# Patient Record
Sex: FEMALE | Race: WHITE | Employment: UNEMPLOYED
[De-identification: names, ages, dates, MRNs, and addresses within clinical notes are randomized per-mention and may not be internally consistent; named-entity substitution may affect disease eponyms.]

---
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
## 2020-02-10 ENCOUNTER — OFFICE VISIT (OUTPATIENT)
Dept: OBGYN CLINIC | Age: 20
End: 2020-02-10
Payer: COMMERCIAL

## 2020-02-10 VITALS
DIASTOLIC BLOOD PRESSURE: 72 MMHG | WEIGHT: 127.6 LBS | BODY MASS INDEX: 22.61 KG/M2 | SYSTOLIC BLOOD PRESSURE: 112 MMHG | HEIGHT: 63 IN

## 2020-02-10 PROCEDURE — 99213 OFFICE O/P EST LOW 20 MIN: CPT | Performed by: NURSE PRACTITIONER

## 2020-08-17 ENCOUNTER — OFFICE VISIT (OUTPATIENT)
Dept: OBGYN CLINIC | Age: 20
End: 2020-08-17
Payer: COMMERCIAL

## 2020-08-17 VITALS
BODY MASS INDEX: 21.62 KG/M2 | DIASTOLIC BLOOD PRESSURE: 56 MMHG | SYSTOLIC BLOOD PRESSURE: 122 MMHG | HEIGHT: 63 IN | WEIGHT: 122 LBS

## 2020-08-17 PROCEDURE — 99395 PREV VISIT EST AGE 18-39: CPT | Performed by: OBSTETRICS & GYNECOLOGY

## 2020-08-17 PROCEDURE — 90471 IMMUNIZATION ADMIN: CPT | Performed by: OBSTETRICS & GYNECOLOGY

## 2020-08-17 PROCEDURE — 90651 9VHPV VACCINE 2/3 DOSE IM: CPT | Performed by: OBSTETRICS & GYNECOLOGY

## 2020-08-17 ASSESSMENT — ENCOUNTER SYMPTOMS
WHEEZING: 0
ABDOMINAL PAIN: 0
VOMITING: 0
COUGH: 0
CONSTIPATION: 0
DIARRHEA: 0
NAUSEA: 0

## 2020-08-17 NOTE — PROGRESS NOTES
After obtaining consent, and per orders of Dr. Elif Azul, injection of Gardasil 0.5 ml given in Right deltoid by Miki Diego. Patient instructed to remain in clinic for 20 minutes afterwards, and to report any adverse reaction to me immediately.     LOT [id number]  EXP 4/3/22  [address] [id number]

## 2020-08-17 NOTE — PROGRESS NOTES
[facility] PHYSICIANS  [facility] [address] [address]  Dept: [phone]    DATE OF VISIT:  20      History and Physical    Zuhair Lockhart    :  2000  CHIEF COMPLAINT:    Chief Complaint   Patient presents with    Annual Exam    Injections     Interested in Gardasil                     HPI :   Zuhair Lockhart is a 21 y.o. female here for her annual exam. She is sexually active and uses OCPs for contraception. Periods are regular, occurring on the placebo week, not heavy or painful. She would like to initiate her Gardasil series. _____________________________________________________________________  Past Medical History:   Diagnosis Date    Asthma     activity induced    Stress     gets hives                                                                   Past Surgical History:   Procedure Laterality Date    ADENOIDECTOMY      TONSILLECTOMY      WISDOM TOOTH EXTRACTION  2018     Family History   Problem Relation Age of Onset    Migraines Mother     High Blood Pressure Father     Migraines Maternal Grandmother     Heart Attack Maternal Grandfather     Diabetes Paternal Grandmother         IDDM    Breast Cancer Paternal Grandmother         late 52's    Heart Attack Paternal Grandfather     Diabetes Paternal Grandfather         IDDM     Social History     Tobacco Use   Smoking Status Never Smoker   Smokeless Tobacco Never Used     Social History     Substance and Sexual Activity   Alcohol Use No    Alcohol/week: 0.0 standard drinks     Current Outpatient Medications   Medication Sig Dispense Refill    albuterol sulfate  (90 Base) MCG/ACT inhaler Inhale 2 puffs into the lungs every 6 hours as needed for Wheezing      Norethin Ace-Eth Estrad-FE (LOESTRIN 24 FE) 1-20 MG-MCG(24) TABS Take 28 tablets by mouth daily for 28 days Barrier contraception is recommended. 28 tablet 12     No current facility-administered medications for this visit. Orders   1. Need for HPV vaccine  OK INJECTION,THERAP/PROPH/DIAGNOST, IM OR SUBCUT    HPV vaccine 9-valent IM (GARDASIL 9)   2. Well woman exam without gynecological exam       Return in about 4 weeks (around 9/14/2020) for 2nd Gardasil. PLAN:  -Discussed need for Pap smears starting at age 24.  -Discussed STD's and condom use. Declines STD testing.   - Birth control Discussed. - Smoking risk factors Discussed. - Diet and exercise reviewed. - Routine health maintenance per patients PCP.      Electronically signed by Lady Sanchez MD on 8/17/20 at 10:54 AM EDT  [facility]

## 2020-10-19 ENCOUNTER — NURSE ONLY (OUTPATIENT)
Dept: OBGYN CLINIC | Age: 20
End: 2020-10-19
Payer: COMMERCIAL

## 2020-10-19 VITALS
WEIGHT: 126.8 LBS | BODY MASS INDEX: 22.47 KG/M2 | DIASTOLIC BLOOD PRESSURE: 70 MMHG | HEIGHT: 63 IN | SYSTOLIC BLOOD PRESSURE: 118 MMHG

## 2020-10-19 PROCEDURE — 90651 9VHPV VACCINE 2/3 DOSE IM: CPT | Performed by: NURSE PRACTITIONER

## 2020-10-19 PROCEDURE — 90471 IMMUNIZATION ADMIN: CPT | Performed by: NURSE PRACTITIONER

## 2021-01-19 ENCOUNTER — OFFICE VISIT (OUTPATIENT)
Dept: OBGYN CLINIC | Age: 21
End: 2021-01-19
Payer: COMMERCIAL

## 2021-01-19 VITALS
HEIGHT: 63 IN | DIASTOLIC BLOOD PRESSURE: 86 MMHG | SYSTOLIC BLOOD PRESSURE: 124 MMHG | WEIGHT: 128 LBS | BODY MASS INDEX: 22.68 KG/M2

## 2021-01-19 DIAGNOSIS — N94.10 DYSPAREUNIA IN FEMALE: ICD-10-CM

## 2021-01-19 DIAGNOSIS — N92.1 BREAKTHROUGH BLEEDING ON BIRTH CONTROL PILLS: Primary | ICD-10-CM

## 2021-01-19 LAB
CONTROL: PRESENT
PREGNANCY TEST URINE, POC: NEGATIVE

## 2021-01-19 PROCEDURE — 99213 OFFICE O/P EST LOW 20 MIN: CPT | Performed by: OBSTETRICS & GYNECOLOGY

## 2021-01-19 PROCEDURE — 81025 URINE PREGNANCY TEST: CPT | Performed by: OBSTETRICS & GYNECOLOGY

## 2021-01-19 RX ORDER — NORGESTIMATE AND ETHINYL ESTRADIOL 0.25-0.035
1 KIT ORAL DAILY
Qty: 1 PACKET | Refills: 12 | Status: SHIPPED | OUTPATIENT
Start: 2021-01-19 | End: 2022-02-01 | Stop reason: CLARIF

## 2021-01-19 ASSESSMENT — ENCOUNTER SYMPTOMS
CONSTIPATION: 0
WHEEZING: 0
NAUSEA: 0
DIARRHEA: 0
COUGH: 0
ABDOMINAL PAIN: 0
VOMITING: 0

## 2021-01-19 NOTE — PROGRESS NOTES
[facility], [address] [place] [address]  DATE OF VISIT:  21    Amado Brink    :  2000  CHIEF COMPLAINT:    Chief Complaint   Patient presents with    Discuss Medications     Currently on an OCP with BTB during the month    Dyspareunia     x1-2 months most of the time uncomfortable        HPI :   Amado Brink is a 21 y.o. female here for breakthrough bleeding on her OCP and dyspareunia. The bleeding has been happening over the past few months. Unpredictable. Sometimes just spotting, sometimes it's another full period. Pain with intercourse has been going on for a few months as well. Rates it as 7/10 and lasts several minutes. Past Medical History:   Diagnosis Date    Asthma     activity induced    Stress     gets hives      Past Surgical History:   Procedure Laterality Date    ADENOIDECTOMY      TONSILLECTOMY      WISDOM TOOTH EXTRACTION  2018     Family History   Problem Relation Age of Onset    Migraines Mother     High Blood Pressure Father     Migraines Maternal Grandmother     Heart Attack Maternal Grandfather     Diabetes Paternal Grandmother         IDDM    Breast Cancer Paternal Grandmother         late 52's    Heart Attack Paternal Grandfather     Diabetes Paternal Grandfather         IDDM     Social History     Tobacco Use   Smoking Status Never Smoker   Smokeless Tobacco Never Used     Social History     Substance and Sexual Activity   Alcohol Use No    Alcohol/week: 0.0 standard drinks     Current Outpatient Medications   Medication Sig Dispense Refill    norgestimate-ethinyl estradiol (ORTHO-CYCLEN, 28,) 0.25-35 MG-MCG per tablet Take 1 tablet by mouth daily 1 packet 12    Norethin Ace-Eth Estrad-FE (LOESTRIN 24 FE) 1-20 MG-MCG(24) TABS Take 28 tablets by mouth daily for 28 days Barrier contraception is recommended.  28 tablet 12    albuterol sulfate  (90 Base) MCG/ACT inhaler Inhale 2 puffs into the lungs every 6 hours as needed for Wheezing No current facility-administered medications for this visit. Allergies:  Patient has no known allergies. Gynecologic History:  Patient's last menstrual period was 2020. Sexually Active: Yes  STD History: No      OB History    Para Term  AB Living   0 0 0 0 0 0   SAB TAB Ectopic Molar Multiple Live Births   0 0 0 0 0 0       Review of Systems   Constitutional: Negative for chills and fever. HENT: Negative for hearing loss. Respiratory: Negative for cough and wheezing. Cardiovascular: Negative for chest pain and palpitations. Gastrointestinal: Negative for abdominal pain, constipation, diarrhea, nausea and vomiting. Genitourinary: Negative for dysuria, frequency and urgency. Musculoskeletal: Negative for myalgias. Skin: Negative for rash. Neurological: Negative for dizziness, weakness and headaches. Hematological: Does not bruise/bleed easily. Psychiatric/Behavioral: Negative for suicidal ideas. /86 (Position: Sitting, Cuff Size: Medium Adult)   Ht 5' 3\" (1.6 m)   Wt 128 lb (58.1 kg)   LMP 2020   BMI 22.67 kg/m²     Physical Exam  Constitutional:       Appearance: She is well-developed. HENT:      Head: Normocephalic. Neck:      Musculoskeletal: Normal range of motion. Thyroid: No thyromegaly. Cardiovascular:      Rate and Rhythm: Normal rate and regular rhythm. Pulmonary:      Effort: Pulmonary effort is normal. No respiratory distress. Abdominal:      General: There is no distension. Palpations: Abdomen is soft. Tenderness: There is no abdominal tenderness. Musculoskeletal: Normal range of motion. Skin:     General: Skin is warm and dry. Neurological:      Mental Status: She is alert and oriented to person, place, and time. Psychiatric:         Behavior: Behavior normal.         Thought Content: Thought content normal.         Judgment: Judgment normal.         ASSESSMENT:  Kristie Saenz is a 21 y.o. female      ICD-10-CM    1. Breakthrough bleeding on birth control pills  N92.1 POCT urine pregnancy   2. Dyspareunia in female  N94.10 US PELVIS COMPLETE NON-OB TRANSABDOMINAL AND TRANSVAGINAL       PLAN:    Will check US for dyspareunia, pain not reproducible on exam. Consider Ibuprofen prior to intercourse, or pelvic floor PT. Will switch to 35 mcg estrogen pill.      Electronically signed by Divya Nix MD on 1/19/2021 at 4:13 PM

## 2022-01-31 ASSESSMENT — ENCOUNTER SYMPTOMS
CONSTIPATION: 0
ABDOMINAL DISTENTION: 0
SHORTNESS OF BREATH: 0
ABDOMINAL PAIN: 0
COUGH: 0
DIARRHEA: 0
BACK PAIN: 0

## 2022-01-31 NOTE — PROGRESS NOTES
Katherine Saucedo is a 24 y.o. Luigi Heller at Unknown with Estimated Date of Delivery: None noted. who presents for prenatal care. This is a planned pregnancy : No  No LMP recorded. 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 Patientdoes intend to breast feed. Pregnancy history fully reviewed. Mother's ethnicity:   Father's ethnicity:       Obstetric history:  History of  delivery or short cervix NA  History of diabetes, glucose intolerance, gastric bypass surgery or gestational diabetes father has DM2  History of CHTN, preeclampsia or gestational hypertension denies  History of shoulder dystocia, postpartum hemorrhage, IUGR, macrosomia, severe anemia, hyperemesis or 4th degree lacerations NA  History of genital HSV denies  History of uterine surgeries or LEEP: NA      POB:   OB History    Para Term  AB Living   0 0 0 0 0 0   SAB IAB Ectopic Molar Multiple Live Births   0 0 0 0 0 0     Complications from previous pregnancies/deliveries    PGYN: denies STDs; denies abnormal pap smears                      Menses regular yes  Contraception none    PMH:  has a past medical history of Asthma and Stress. PSH:  has a past surgical history that includes Tonsillectomy; Adenoidectomy; and [place] tooth extraction (2018). FH:family history includes Breast Cancer in her paternal grandmother; Diabetes in her paternal grandfather and paternal grandmother; Heart Attack in her maternal grandfather and paternal grandfather; High Blood Pressure in her father; Migraines in her maternal grandmother and mother. SH: denies X 3, family supportive  reports that she has never smoked.  She has never used smokeless tobacco. She reports that she does not drink alcohol and does not use drugs. Review of Systems   Constitutional: Negative for appetite change and fatigue. HENT: Negative for congestion and hearing loss. Eyes: Negative for visual disturbance. Respiratory: Negative for cough and shortness of breath. Cardiovascular: Negative for chest pain and palpitations. Gastrointestinal: Negative for abdominal distention, abdominal pain, constipation and diarrhea. Genitourinary: Negative for flank pain, frequency, menstrual problem, pelvic pain and vaginal discharge. Musculoskeletal: Negative for back pain. Neurological: Negative for syncope and headaches. Psychiatric/Behavioral: Negative for behavioral problems. Physical exam:There were no vitals taken for this visit. General Appearance: alert and oriented to person,place and time, well developed and well- nourished, in no acute distress  Skin: warm and dry, no rash or erythema  Head: normocephalic and atraumatic  Eyes: extraocular eye movements intact, conjunctivae normal  ENT:  external ear and ear canal normal bilaterally, nose without deformity, nasal mucosa normal   Neck: supple and non-tender without mass,   Pulmonary/Chest: pulmonary effort wnl  Abdomen: soft, non-tender, non-distended, no masses or organomegaly  Extremities: no cyanosis, clubbing or edema  Musculoskeletal: normal rangeof motion, no joint swelling, deformity or tenderness  Neurologic: gait, coordination and speech normal  Breast: without skin retraction, dimpling, puckering, nipple discharge or masses. There is no axillary adenopathy      Pelvic: external genitalia WNL's, no rashes, no lesions. Speculum exam: vaginal vault pink, wellrugated, without lesions. No discharge. Cervix without lesions. Bimanual exam: no cervical motion tenderness.                                             Impression: @ Unknown by LMP             Patient Active Problem List   Diagnosis    Irregular menses       Plan:     The patient was seen full history and physical was completed/reviewed. - Prenatal labs ordered   - Prenatal vitamins prescription Given   - Problem list reviewed and updated   - NT Screen/Quad Testing and MSAFP Single Marker: undecided, lab considering   - Role of ultrasound in pregnancy discussed; requests fetal survey, MFM referral not indicated   - Gc/Chlam Cultures & Wet Prep Collected, results ordered   - Pap Smear done  Counseling Completed: The patient was counseled on office policies and she was counseled on termination of pregnancy in the state of [place].  labor precautions were reviewed and she is to contact the office if she experiences vaginal bleeding, leakage of fluid or abdominal pain. The patient was counseled on Toxoplasmosis, HIV, Tobacco Abuse, Group Beta Strep Infections, Cystic Fibrosis,  Labor precautions and Sickle Cell disease. Her medication list was reviewed along with the need to clarify if new medications prescribed or used are okay in pregnancy before taking them. The patient was counseled on the risks of tobacco abuse. Both maternal and fetal. She was instructed to stop smoking if currently using tobacco. Morbidity, mortality, and cessation programs were reviewed. The risks include but are not limited to increased risks of  labor,  delivery, premature rupture of membranes, intrauterine growth restriction, intrauterine fetal demise and abruptio placenta. Secondary smoke risks were also reviewed. Increases in cancer, respiratory problems, and sudden infant death syndrome were reviewed as well. The patient was informed of a 2-4% risk of congenital anomalies in the general population. She was also informed that karyotyping is the only way to evaluate the fetus for genetic problems and genetic lethal anomalies. Chorionic villous sampling, amniocentesis and VeriFi were also discussed with morbidity rates in detail. She undecided the procedure. Route of delivery and counseling on vaginal, operative vaginal, and  sections were completed with the risks of each to both the patient as well as her baby. The possibility of a blood transfusion was discussed as well. The patient was not opposed to receiving a transfusion if needed. Nuchal translucency/Quad Evaluation and MSAFP single marker testing was reviewed in detail with attention to timing of testing and their windows. For patients beyond the gestational age for Nuchal translucency evaluation Quad testing was recommended. Timing for the Quad test was reviewed. Benefits of the above testing was reviewed. A second trimester amniocentesis was also made available to the patient. Risks, Benefits and non-invasive alternative testing was reviewed. T-dap Vaccine recommendations reviewed with the patient. Patient notified of timing of vaccination 27-36 weeks gestation. Patient aware Vaccine is not Live. - Precautions given for ectopic pregnancy including increased abdominal pain, vaginal bleeding. Patient knows to go to ED should these symptoms occur. No orders of the defined types were placed in this encounter.

## 2022-02-01 ENCOUNTER — OFFICE VISIT (OUTPATIENT)
Dept: OBGYN CLINIC | Age: 22
End: 2022-02-01
Payer: COMMERCIAL

## 2022-02-01 ENCOUNTER — HOSPITAL ENCOUNTER (OUTPATIENT)
Age: 22
Setting detail: SPECIMEN
Discharge: HOME | End: 2022-02-01

## 2022-02-01 VITALS
HEIGHT: 63 IN | DIASTOLIC BLOOD PRESSURE: 72 MMHG | BODY MASS INDEX: 23.14 KG/M2 | WEIGHT: 130.6 LBS | SYSTOLIC BLOOD PRESSURE: 128 MMHG

## 2022-02-01 DIAGNOSIS — Z32.01 POSITIVE PREGNANCY TEST: Primary | ICD-10-CM

## 2022-02-01 DIAGNOSIS — Z83.3 FAMILY HISTORY OF DIABETES MELLITUS IN FATHER: ICD-10-CM

## 2022-02-01 DIAGNOSIS — Z32.01 POSITIVE PREGNANCY TEST: ICD-10-CM

## 2022-02-01 LAB
-: ABNORMAL
ABO/RH: NORMAL
ABSOLUTE EOS #: 0.11 K/UL (ref 0–0.44)
ABSOLUTE IMMATURE GRANULOCYTE: <0.03 K/UL (ref 0–0.3)
ABSOLUTE LYMPH #: 1.45 K/UL (ref 1.1–3.7)
ABSOLUTE MONO #: 0.65 K/UL (ref 0.1–1.4)
AMORPHOUS: ABNORMAL
AMPHETAMINE SCREEN URINE: NEGATIVE
ANTIBODY SCREEN: NEGATIVE
BACTERIA: ABNORMAL
BARBITURATE SCREEN URINE: NEGATIVE
BASOPHILS # BLD: 1 % (ref 0–2)
BASOPHILS ABSOLUTE: 0.04 K/UL (ref 0–0.2)
BENZODIAZEPINE SCREEN, URINE: NEGATIVE
BILIRUBIN URINE: NEGATIVE
BUPRENORPHINE URINE: NORMAL
CANDIDA SPECIES, DNA PROBE: NEGATIVE
CANNABINOID SCREEN URINE: NEGATIVE
CASTS UA: ABNORMAL /LPF (ref 0–2)
COCAINE METABOLITE, URINE: NEGATIVE
COLOR: YELLOW
COMMENT UA: ABNORMAL
CONTROL: PRESENT
CRYSTALS, UA: ABNORMAL /HPF
CRYSTALS, UA: ABNORMAL /HPF
DIFFERENTIAL TYPE: ABNORMAL
EOSINOPHILS RELATIVE PERCENT: 1 % (ref 1–4)
EPITHELIAL CELLS UA: ABNORMAL /HPF (ref 0–5)
GARDNERELLA VAGINALIS, DNA PROBE: NEGATIVE
GLUCOSE URINE: NEGATIVE
HCT VFR BLD CALC: 39.3 % (ref 36.3–47.1)
HEMOGLOBIN: 12.6 G/DL (ref 11.9–15.1)
HEPATITIS B SURFACE ANTIGEN: NONREACTIVE
HEPATITIS C ANTIBODY: NONREACTIVE
HIV AG/AB: NONREACTIVE
IMMATURE GRANULOCYTES: 0 %
KETONES, URINE: NEGATIVE
LEUKOCYTE ESTERASE, URINE: NEGATIVE
LYMPHOCYTES # BLD: 18 % (ref 25–45)
MCH RBC QN AUTO: 26.6 PG (ref 25.2–33.5)
MCHC RBC AUTO-ENTMCNC: 32.1 G/DL (ref 28.4–34.8)
MCV RBC AUTO: 83.1 FL (ref 82.6–102.9)
MDMA URINE: NORMAL
METHADONE SCREEN, URINE: NEGATIVE
METHAMPHETAMINE, URINE: NORMAL
MONOCYTES # BLD: 8 % (ref 2–8)
MUCUS: ABNORMAL
NITRITE, URINE: NEGATIVE
NRBC AUTOMATED: 0 PER 100 WBC
OPIATES, URINE: NEGATIVE
OTHER OBSERVATIONS UA: ABNORMAL
OXYCODONE SCREEN URINE: NEGATIVE
PAP SMEAR: NORMAL
PDW BLD-RTO: 15.9 % (ref 11.8–14.4)
PH UA: 5.5 (ref 5–8)
PHENCYCLIDINE, URINE: NEGATIVE
PLATELET # BLD: 319 K/UL (ref 138–453)
PLATELET ESTIMATE: ABNORMAL
PMV BLD AUTO: 11.3 FL (ref 8.1–13.5)
PREGNANCY TEST URINE, POC: POSITIVE
PROPOXYPHENE, URINE: NORMAL
PROTEIN UA: NEGATIVE
RBC # BLD: 4.73 M/UL (ref 3.95–5.11)
RBC # BLD: ABNORMAL 10*6/UL
RBC UA: ABNORMAL /HPF (ref 0–2)
RENAL EPITHELIAL, UA: ABNORMAL /HPF
RUBV IGG SER QL: 44.6 IU/ML
SEG NEUTROPHILS: 72 % (ref 34–64)
SEGMENTED NEUTROPHILS ABSOLUTE COUNT: 5.77 K/UL (ref 1.5–8.1)
SOURCE: NORMAL
SPECIFIC GRAVITY UA: 1.03 (ref 1–1.03)
T. PALLIDUM, IGG: NONREACTIVE
TEST INFORMATION: NORMAL
TRICHOMONAS VAGINALIS DNA: NEGATIVE
TRICHOMONAS: ABNORMAL
TRICYCLIC ANTIDEPRESSANTS, UR: NORMAL
TURBIDITY: ABNORMAL
URINE HGB: NEGATIVE
UROBILINOGEN, URINE: NORMAL
WBC # BLD: 8 K/UL (ref 4.5–13.5)
WBC # BLD: ABNORMAL 10*3/UL
WBC UA: ABNORMAL /HPF (ref 0–5)
YEAST: ABNORMAL

## 2022-02-01 PROCEDURE — 81025 URINE PREGNANCY TEST: CPT | Performed by: NURSE PRACTITIONER

## 2022-02-01 PROCEDURE — 99213 OFFICE O/P EST LOW 20 MIN: CPT | Performed by: NURSE PRACTITIONER

## 2022-02-01 NOTE — PATIENT INSTRUCTIONS
Patient Education        Weeks 10 to 14 of Your Pregnancy: Care Instructions  Overview     By weeks 10 to 15 of your pregnancy, the placenta has formed inside your uterus. The placenta's main job is to give your baby oxygen and nutrients through the umbilical cord. It's possible to hear your baby's heartbeat with a special ultrasound device. Your baby's organs are developing. The arms and legs can bend. This is a good time to think about testing for birth defects. There are two types of tests: screening and diagnostic. Screening tests show the chance that a baby has a certain birth defect. They can't tell you for sure that your baby has a problem. Diagnostic tests show if a baby has a certain birth defect. It's your choice whether to have these tests. You and your partner can talk to your doctor or midwife about tests for birth defects. Follow-up care is a key part of your treatment and safety. Be sure to make and go to all appointments, and call your doctor if you are having problems. It's also a good idea to know your test results and keep a list of the medicines you take. How can you care for yourself at home? Decide about tests  · You can have screening tests and diagnostic tests to check for birth defects. The decision to have a test for birth defects is personal. Think about your age, your chance of passing on a family disease, your need to know about any problems, and what you might do after you have the test results. ? Quadruple (quad) blood test. This screening test can be done between 15 and 22 weeks of pregnancy. It checks the amount of four substances in your blood. The doctor looks at these test results, along with your age and other factors, to find out the chance that your baby may have certain problems. ? Amniocentesis. This diagnostic test is used to look for chromosomal problems in the baby's cells.  It can be done between 15 and 20 weeks of pregnancy, usually around week 16.  ? Nuchal translucency test. This test uses ultrasound to measure the thickness of the area at the back of the baby's neck. An increase in the thickness can be an early sign of Down syndrome. ? Chorionic villus sampling (CVS). This is a test that looks for certain genetic problems with your baby. The same genes that are in your baby are in the placenta. A small piece of the placenta is taken out and tested. This test is done when you are 10 to 13 weeks pregnant. Ease discomfort  · Slow down and take naps when you feel tired. · If your emotions swing, talk to someone. · If your gums bleed, try a softer toothbrush. If your gums are puffy and bleed a lot, see your dentist.  · If you feel dizzy:  ? Get up slowly after sitting or lying down. ? Drink plenty of fluids. ? Eat small snacks to keep your blood sugar stable. ? Put your head between your legs as though you were tying your shoelaces. ? Lie down with your legs higher than your head. Use pillows to prop up your feet. · If you have a headache:  ? Lie down. ? Ask your partner or a good friend for a neck massage. ? Try cool cloths over your forehead or across the back of your neck. ? Use acetaminophen (Tylenol) for pain relief. Do not use nonsteroidal anti-inflammatory drugs (NSAIDs), such as ibuprofen (Advil, Motrin) or naproxen (Aleve), unless your doctor says it is okay. · If you have a nosebleed, pinch your nose gently, and hold it for a short while. To prevent nosebleeds, try massaging a small dab of petroleum jelly, such as Vaseline, in your nostrils. · If your nose is stuffed up, try saline (saltwater) nose sprays. Do not use decongestant sprays. Care for your breasts  · Wear a bra that gives you good support. · Know that changes in your breasts are normal.  ? Your breasts may get larger and more tender. Tenderness usually gets better by 12 weeks. ? Your nipples may get darker and larger, and small bumps around your nipples may show more. ?  The veins in your chest and breasts may show more. Where can you learn more? Go to https://chpepiceweb.[email or web address] org and sign in to your [employer] account. Enter B213 in the [employer] box to learn more about \"Weeks 10 to 14 of Your Pregnancy: Care Instructions. \"     If you do not have an account, please click on the \"Sign Up Now\" link. Current as of: June 16, 2021               Content Version: 13.1  © 8254-5033 Healthwise, Incorporated. Care instructions adapted under license by [facility] ([facility]). If you have questions about a medical condition or this instruction, always ask your healthcare professional. [address] any warranty or liability for your use of this information.

## 2022-02-02 LAB
C TRACH DNA GENITAL QL NAA+PROBE: NEGATIVE
CULTURE: NORMAL
Lab: NORMAL
N. GONORRHOEAE DNA: NEGATIVE
SPECIMEN DESCRIPTION: NORMAL
SPECIMEN DESCRIPTION: NORMAL

## 2022-02-07 DIAGNOSIS — Z32.01 POSITIVE PREGNANCY TEST: ICD-10-CM

## 2022-02-09 LAB — CYSTIC FIBROSIS: NORMAL

## 2022-02-16 ENCOUNTER — INITIAL PRENATAL (OUTPATIENT)
Dept: OBGYN CLINIC | Age: 22
End: 2022-02-16

## 2022-02-16 VITALS — SYSTOLIC BLOOD PRESSURE: 104 MMHG | DIASTOLIC BLOOD PRESSURE: 64 MMHG

## 2022-02-16 DIAGNOSIS — Z34.91 NORMAL PREGNANCY IN FIRST TRIMESTER: ICD-10-CM

## 2022-02-16 DIAGNOSIS — Z83.3 FH: TYPE 2 DIABETES: ICD-10-CM

## 2022-02-16 DIAGNOSIS — Z3A.10 10 WEEKS GESTATION OF PREGNANCY: ICD-10-CM

## 2022-02-16 DIAGNOSIS — Z3A.11 11 WEEKS GESTATION OF PREGNANCY: Primary | ICD-10-CM

## 2022-02-16 NOTE — PATIENT INSTRUCTIONS
Patient Education        Weeks 10 to 14 of Your Pregnancy: Care Instructions  Overview     By weeks 10 to 15 of your pregnancy, the placenta has formed inside your uterus. The placenta's main job is to give your baby oxygen and nutrients through the umbilical cord. It's possible to hear your baby's heartbeat with a special ultrasound device. Your baby's organs are developing. The arms and legs can bend. This is a good time to think about testing for birth defects. There are two types of tests: screening and diagnostic. Screening tests show the chance that a baby has a certain birth defect. They can't tell you for sure that your baby has a problem. Diagnostic tests show if a baby has a certain birth defect. It's your choice whether to have these tests. You and your partner can talk to your doctor or midwife about tests for birth defects. Follow-up care is a key part of your treatment and safety. Be sure to make and go to all appointments, and call your doctor if you are having problems. It's also a good idea to know your test results and keep a list of the medicines you take. How can you care for yourself at home? Decide about tests  · You can have screening tests and diagnostic tests to check for birth defects. The decision to have a test for birth defects is personal. Think about your age, your chance of passing on a family disease, your need to know about any problems, and what you might do after you have the test results. ? Quadruple (quad) blood test. This screening test can be done between 15 and 22 weeks of pregnancy. It checks the amount of four substances in your blood. The doctor looks at these test results, along with your age and other factors, to find out the chance that your baby may have certain problems. ? Amniocentesis. This diagnostic test is used to look for chromosomal problems in the baby's cells.  It can be done between 15 and 20 weeks of pregnancy, usually around week 16.  ? Nuchal translucency test. This test uses ultrasound to measure the thickness of the area at the back of the baby's neck. An increase in the thickness can be an early sign of Down syndrome. ? Chorionic villus sampling (CVS). This is a test that looks for certain genetic problems with your baby. The same genes that are in your baby are in the placenta. A small piece of the placenta is taken out and tested. This test is done when you are 10 to 13 weeks pregnant. Ease discomfort  · Slow down and take naps when you feel tired. · If your emotions swing, talk to someone. · If your gums bleed, try a softer toothbrush. If your gums are puffy and bleed a lot, see your dentist.  · If you feel dizzy:  ? Get up slowly after sitting or lying down. ? Drink plenty of fluids. ? Eat small snacks to keep your blood sugar stable. ? Put your head between your legs as though you were tying your shoelaces. ? Lie down with your legs higher than your head. Use pillows to prop up your feet. · If you have a headache:  ? Lie down. ? Ask your partner or a good friend for a neck massage. ? Try cool cloths over your forehead or across the back of your neck. ? Use acetaminophen (Tylenol) for pain relief. Do not use nonsteroidal anti-inflammatory drugs (NSAIDs), such as ibuprofen (Advil, Motrin) or naproxen (Aleve), unless your doctor says it is okay. · If you have a nosebleed, pinch your nose gently, and hold it for a short while. To prevent nosebleeds, try massaging a small dab of petroleum jelly, such as Vaseline, in your nostrils. · If your nose is stuffed up, try saline (saltwater) nose sprays. Do not use decongestant sprays. Care for your breasts  · Wear a bra that gives you good support. · Know that changes in your breasts are normal.  ? Your breasts may get larger and more tender. Tenderness usually gets better by 12 weeks. ? Your nipples may get darker and larger, and small bumps around your nipples may show more. ?  The veins in your chest and breasts may show more. Where can you learn more? Go to https://chpepiceweb.[email or web address] org and sign in to your [employer] account. Enter L492 in the [facility] box to learn more about \"Weeks 10 to 14 of Your Pregnancy: Care Instructions. \"     If you do not have an account, please click on the \"Sign Up Now\" link. Current as of: June 16, 2021               Content Version: 13.1  © 2335-6034 [employer]. Care instructions adapted under license by [facility] ([facility]). If you have questions about a medical condition or this instruction, always ask your healthcare professional. [place] [address] any warranty or liability for your use of this information. Patient Education        Learning About Birth Defects Testing  What is birth defects testing? Birth defects testing is done during pregnancy to look for possible problems with the baby (fetus). A birth defect may have only a minor impact on a child's life. Or it can have a major effect on quality or length of life. You and your doctor can choose from many tests. You may have no tests, one test, or many tests. What are the types of tests? You may have a screening test, a diagnostic test, or both. Screening tests show the chance that a baby has a certain birth defect, such as Down syndrome, spina bifida, or trisomy 25. Diagnostic tests show if a baby has a certain birth defect. · Screening tests and when they're done:  ? Blood tests at 10 to 13 weeks (first trimester)  ? Cell-free fetal DNA test at 10 weeks or later (first trimester)  ? Nuchal translucency test at 10 to 13 weeks (first trimester)  ? Quad screening at 15 to 22 weeks (second trimester)  ? Ultrasound at 18 to 22 weeks (second trimester)  · Diagnostic tests and when they're done:  ? Chorionic villus sampling (CVS) at 10 to 13 weeks (first trimester)  ?  Amniocentesis at 13 to 20 weeks (second trimester)  Sometimes doctors will look at the combined screenings that you've had over a period of time. This is called an integrated screening. What are the screening tests? · Blood tests are done to look at different substances in your blood. These tests include cell free fetal DNA and quadruple (quad) blood tests. Both of these tests can help find genetic problems. · Nuchal translucency test uses ultrasound to measure the thickness of the area at the back of the baby's neck. An increase in thickness can be an early sign of certain birth defects. Ultrasound is a tool that uses sound waves to make pictures of your baby and placenta inside the uterus. · Ultrasound lets your doctor see an image of your baby. It can help your doctor look for problems of the heart, spine, belly, or other areas. What are the diagnostic tests? Chorionic villus sampling (CVS) looks at cells from the placenta. To do the test, your doctor may put a thin tube through your vagina and cervix to take out a small piece of the placenta. Or the doctor may take out the piece through a needle in your belly. This test can diagnose many genetic diseases. But it can't find problems with the spinal cord. Amniocentesis looks at the amniotic fluid that surrounds your baby. Your doctor will put a needle through your belly into your uterus and take out a very small amount of fluid to test.  What are the risks of these tests? There is a small risk of a miscarriage after a CVS or amniocentesis. Your doctor or genetic counselor can help you understand this risk. These tests are generally very safe. Where can you learn more? Go to https://Jetaportwilli.[employer]. org and sign in to your [employer] account. Enter G030 in the [employer] box to learn more about \"Learning About Birth Defects Testing. \"     If you do not have an account, please click on the \"Sign Up Now\" link. Current as of: June 16, 2021               Content Version: 13.1  © 6922-1763 [employer].    Care instructions adapted under license by [facility] ([facility]). If you have questions about a medical condition or this instruction, always ask your healthcare professional. [address] any warranty or liability for your use of this information.

## 2022-02-16 NOTE — PROGRESS NOTES
Relationship with FOB: partner, not living together, first child together  Partner's name: Shala Lamp to Breast feeding  Pain Score: 0/10  Job title:full time Student  This is a planned pregnancy:No  Certain LMP:Yes LMP 00/40/07  S/S of pregnancy:Yes missed period, breast tenderness, nausea   Hx N/V pregnancy:Nausea intermittent, and emesis 0-1. Gagging. Mother's ethnicity:   Father's ethnicity: Hisp/      -  Patient Active Problem List   Diagnosis    Irregular menses     Last menstrual period 11/30/2021, not currently breastfeeding. Koki Sanchez is a 24 y.o. [facility], here for her ACOG. The patients past medical, surgical, social and family history were reviewed. Current medications and allergies were reviewed, and documented in the chart. Menstrual history: regular  Birth control: BCP    Wt Readings from Last 3 Encounters:   02/01/22 130 lb 9.6 oz (59.2 kg)   01/19/21 128 lb (58.1 kg)   10/19/20 126 lb 12.8 oz (57.5 kg)     Recent Results (from the past 8736 hour(s))   Cystic Fibrosis    Collection Time: 02/01/22 12:00 AM   Result Value Ref Range    Cystic Fibrosis       Specimen(s) Received:  Peripheral blood, CF  Clinical Information:  Patient History Unknown  Prenatal Screening for Cystic Fibrosis    RESULTS:  ** This patient is negative for all CF mutations analyzed **  Due to the complex nature of this testing, genetic counseling is  recommended  This interpretation is based on the assumption that this individual  has a negative personal and family history for cystic fibrosis    INTERPRETATION:    Using the methods described, this patient tested  negative for all 60 mutations screened, including those recommended by  the [employer] of [employer]stetricians and Gynecologists and the  [employer].   These results do not rule out  the possibility that this individual could carry a CF mutation not  detected by this test.  The patient should understand that DNA studies  do not constitute a definitive carrier test for cystic fibrosis in all  individuals. Thus, interpretation is given as a probability (see  below). Accurate risk c alculation requires accurate family history  information. Inaccurate reporting of a family history of cystic  fibrosis will lead to errors in residual risk assessment. It should  be realized that there are many sources of diagnostic error in  molecular testing which may include trace contamination of PCR  reactions, rare genetic variants that can interfere with the analysis,  or general laboratory error. Cystic Fibrosis Carrier Rate by Racial and Ethnic Group, Before and  After Screening  [from Olaf et al (2001):  Anjelica in Med 3(2), 149-154]                                  Estimated Carrier Risk          Ethnic Group               Detection Rate                     Before Test          After Negative Test       Ashkenazi Samaritan                    97%               1/29 1/930                       90%               1/29 1/240                      69%               1/65 1/207       * American                 57%               1/46 1/105   American                    No data available          1/90           No data available     *Additional information required to accurately predict risk  Note:  Residual carrier risk after negative test is modified by  presence of positive family history of CF or by admixture of ethnic  groups. For these situations, accurate risk assessment requires Bayesian  analysis and genetic counseling    This molecular test has been approved for in vitro diagnostic use by  the U.S. FDA. This test is used for clinical purposes. Pursuant to  the requirements of CLIA '88, this laboratory has established and  verified the test's accuracy and precision.   It should not be regarded  as investigational or for research. This laboratory is certified  under the [address] (CLIA  '88) as qualified to perform high complexity clinical laboratory  testing. Methodology:  Samples are tested for the prese nce of wild type or  mutant gene sequences in the CFTR gene by the LumetricsAGe Cystic  Fibrosis 60 Kit. It is comprised of a single multiplex PCR reaction  that is then used in two separate Allele Specific Primer Extension  reactions, which are followed by two separate bead hybridization  reactions. The [employer] Assay screens for 60 CFTR mutations,  including the standard 23 mutation core panel recommended by the  [employer] of [employer]stetricians and Gynecologists (ACOG) and the  [address] [place] [address] ([facility]):  , , G542X,  G85E, R117H, 621+1G>T, 711+1G>T, S5611E, R334W, R347P, A455E,  1717-1G>A, R560T, R553X, G551D, 1898+1G>A, 2184delA, 2789+5G>A,  3120+1G>A, I8073F, 3659delC, 3849+10kbC>T, J9014G, 1078delT, 394delTT,  Y122X, R347H, V520F, A559T, S549N, S549R, 1898+5G>T, 2183AA>G,  2307insA, K5306H, I8749D, L7169A, 3876delA, 3905insT, CFTRdele2,3,  E60X, R75X, 406-1G>A, G178R, [id number], L206W, 935delA, G330X, Q493X,  Q890X, 1677delTA, 4651ryl3>A, 2143delT, K 710X, F6152H, 5494dfl9,  I4800Y, [id number], T9069R, 3791delC and variants 5T/7T/9T, F508C, I507V,  I506V. For samples positive for R117H, the IVS-8 Poly T variant is  analyzed. **Electronically Signed Out**          Andrew Brothers M.D.         Ge [address], [facility] [address]   Tel [phone]  [employer] [place] [employer] for Brennan Rossi MD,   Layman Gita Brothers MD     PAP SMEAR    Collection Time: 02/01/22 12:00 AM   Result Value Ref Range    Pap Negative for intraephithelial lesion or malignancy Negative for intraephithelial lesion or malignancy, Other POCT urine pregnancy    Collection Time: 02/01/22  8:02 AM   Result Value Ref Range    Preg Test, Ur POSITIVE     Control present    PRENATAL TYPE AND SCREEN    Collection Time: 02/01/22  8:25 AM   Result Value Ref Range    ABO/Rh A POSITIVE     Antibody Screen NEGATIVE    CBC Auto Differential    Collection Time: 02/01/22  8:25 AM   Result Value Ref Range    WBC 8.0 4.5 - 13.5 k/uL    RBC 4.73 3.95 - 5.11 m/uL    Hemoglobin 12.6 11.9 - 15.1 g/dL    Hematocrit 39.3 36.3 - 47.1 %    MCV 83.1 82.6 - 102.9 fL    MCH 26.6 25.2 - 33.5 pg    MCHC 32.1 28.4 - 34.8 g/dL    RDW 15.9 (H) 11.8 - 14.4 %    Platelets 459 579 - 142 k/uL    MPV 11.3 8.1 - 13.5 fL    NRBC Automated 0.0 0.0 per 100 WBC    Differential Type NOT REPORTED     Seg Neutrophils 72 (H) 34 - 64 %    Lymphocytes 18 (L) 25 - 45 %    Monocytes 8 2 - 8 %    Eosinophils % 1 1 - 4 %    Basophils 1 0 - 2 %    Immature Granulocytes 0 0 %    Segs Absolute 5.77 1.50 - 8.10 k/uL    Absolute Lymph # 1.45 1.10 - 3.70 k/uL    Absolute Mono # 0.65 0.10 - 1.40 k/uL    Absolute Eos # 0.11 0.00 - 0.44 k/uL    Basophils Absolute 0.04 0.00 - 0.20 k/uL    Absolute Immature Granulocyte <0.03 0.00 - 0.30 k/uL    WBC Morphology NOT REPORTED     RBC Morphology ANISOCYTOSIS PRESENT     Platelet Estimate NOT REPORTED    Hepatitis B Surface Antigen Obstetric Panel    Collection Time: 02/01/22  8:25 AM   Result Value Ref Range    Hepatitis B Surface Ag NONREACTIVE NONREACTIVE   Hepatitis C Antibody    Collection Time: 02/01/22  8:25 AM   Result Value Ref Range    Hepatitis C Ab NONREACTIVE NONREACTIVE   HIV Screen    Collection Time: 02/01/22  8:25 AM   Result Value Ref Range    HIV Ag/Ab NONREACTIVE NONREACTIVE   Rubella antibody, IgG    Collection Time: 02/01/22  8:25 AM   Result Value Ref Range    Rubella Antibody, IGG 44.6 IU/mL   T. pallidum Ab    Collection Time: 02/01/22  8:25 AM   Result Value Ref Range    T. pallidum, IgG NONREACTIVE NONREACTIVE   VAGINITIS DNA PROBE Collection Time: 02/01/22  5:28 PM    Specimen: Vaginal   Result Value Ref Range    Source . VAGINAL SWAB     Trichomonas Vaginalis DNA NEGATIVE NEGATIVE    GARDNERELLA VAGINALIS, DNA PROBE NEGATIVE NEGATIVE    CANDIDA SPECIES, DNA PROBE NEGATIVE NEGATIVE   C.trachomatis N.gonorrhoeae DNA    Collection Time: 02/01/22  5:29 PM    Specimen: Cervix   Result Value Ref Range    Specimen Description . CERVIX     C. trachomatis DNA NEGATIVE NEGATIVE    N. gonorrhoeae DNA NEGATIVE NEGATIVE   Urinalysis    Collection Time: 02/01/22  5:30 PM   Result Value Ref Range    Color, UA Yellow Yellow    Turbidity UA Cloudy (A) Clear    Glucose, Ur NEGATIVE NEGATIVE    Bilirubin Urine NEGATIVE NEGATIVE    Ketones, Urine NEGATIVE NEGATIVE    Specific Gravity, UA 1.031 (H) 1.005 - 1.030    Urine Hgb NEGATIVE NEGATIVE    pH, UA 5.5 5.0 - 8.0    Protein, UA NEGATIVE NEGATIVE    Urobilinogen, Urine Normal Normal    Nitrite, Urine NEGATIVE NEGATIVE    Leukocyte Esterase, Urine NEGATIVE NEGATIVE    Urinalysis Comments NOT REPORTED    Culture, Urine    Collection Time: 02/01/22  5:30 PM    Specimen: Urine, clean catch   Result Value Ref Range    Specimen Description . CLEAN CATCH URINE     Special Requests NOT REPORTED     Culture NO SIGNIFICANT GROWTH    Urine Drug Screen    Collection Time: 02/01/22  5:30 PM   Result Value Ref Range    Amphetamine Screen, Ur NEGATIVE NEGATIVE    Barbiturate Screen, Ur NEGATIVE NEGATIVE    Benzodiazepine Screen, Urine NEGATIVE NEGATIVE    Cocaine Metabolite, Urine NEGATIVE NEGATIVE    Methadone Screen, Urine NEGATIVE NEGATIVE    Opiates, Urine NEGATIVE NEGATIVE    Phencyclidine, Urine NEGATIVE NEGATIVE    Propoxyphene, Urine NOT REPORTED NEGATIVE    Cannabinoid Scrn, Ur NEGATIVE NEGATIVE    Oxycodone Screen, Ur NEGATIVE NEGATIVE    Methamphetamine, Urine NOT REPORTED NEGATIVE    Tricyclic Antidepressants, Urine NOT REPORTED NEGATIVE    MDMA, Urine NOT REPORTED NEGATIVE    Buprenorphine Urine NOT REPORTED NEGATIVE    Test Information       Assay provides medical screening only. The absence of expected drug(s) and/or metabolite(s) may indicate diluted or adulterated urine, limitations of testing or timing of collection. Microscopic Urinalysis    Collection Time: 02/01/22  5:30 PM   Result Value Ref Range    -          WBC, UA 5 TO 10 0 - 5 /HPF    RBC, UA 0 TO 2 0 - 2 /HPF    Casts UA NOT REPORTED 0 - 2 /LPF    Crystals, UA CALCIUM OXALATE (A) None /HPF    Crystals, UA MODERATE (A) None /HPF    Epithelial Cells UA 10 TO 20 0 - 5 /HPF    Renal Epithelial, UA NOT REPORTED 0 /HPF    Bacteria, UA MODERATE (A) None    Mucus, UA 2+ (A) None    Trichomonas, UA NOT REPORTED None    Amorphous, UA NOT REPORTED None    Other Observations UA NOT REPORTED NOT REQ.     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)    Stress     gets hives                                                                   Past Surgical History:   Procedure Laterality Date    ADENOIDECTOMY      TONSILLECTOMY      WISDOM TOOTH EXTRACTION  04/2018     Family History   Problem Relation Age of Onset    Migraines Mother     High Blood Pressure Father         oral meds    Diabetes type 2  Father     No Known Problems Brother     Migraines Maternal Grandmother     Thyroid Disease Maternal Grandmother     Diabetes type 2  Maternal Grandmother         oral meds    No Known Problems Maternal Grandfather     Diabetes Paternal Grandmother         IDDM    Breast Cancer Paternal Grandmother         late 52's    Heart Attack Paternal Grandfather     Diabetes Paternal Grandfather         IDDM     Social History     Tobacco Use   Smoking Status Never Smoker   Smokeless Tobacco Never Used     Social History     Substance and Sexual Activity   Alcohol Use No    Alcohol/week: 0.0 standard drinks MEDICATIONS:  Current Outpatient Medications   Medication Sig Dispense Refill    albuterol sulfate  (90 Base) MCG/ACT inhaler Inhale 2 puffs into the lungs every 6 hours as needed for Wheezing       No current facility-administered medications for this visit. ALLERGIES:  Patient has no known allergies. Reviewed global and practice OB care including nausea measures, nutrition, activities, warning signs, and contact information. Offered cell free DNA screen,NT echo and WIC .    `--------------------------------------------------------------------------  Genetic Screening/Teratology Counseling  (Include patient, FOB or anyone in either family)    1) Patient's age 28 years or > at TRAE: No  2) Thalassemia (Mediterranean, ): No  3) Neural Tube Defect:   No  4) Congenital heart defect:   Yes Pt murmur she out grew  5) Trisomy (e.g. Down Syndrome):  No  6) Rodrigo-sachs (Pentecostalism, [address]): No  7) Multiple Births:    Yes FOB MGF identical  8) Sickle cell (disease or trait):  No  9) Hemophilia or blood disorders:  No  10) Muscular Dystrophy:   No  11) Cystic Fibrosis:    No  12) Alexa's chorea:   No  13) Mental retardation/Autism :  No   If yes, was person tested for fragile X: No  14) Other inherited genetic/chromosomal disorder: No  15) Maternal metabolic disorder (DM, PKU): No  16) Child with birth defect not listed:  No  17) Recurrent pregnancy loss/stillbirth: No  18) Medications, supplements/illicit or   Recreational drugs/alcohol since LMP: Yes scant amount alcohol use new years   List: none  19) Any other:   none    Comments/Counseling:  Pt here with FOB/Van and declined all genetic screening. -POC FH Father Type-2 IDDM: Early 1 hr gtt  -POC referral to be sent to [facility] for 20 wk anatomy scan  -POC received Pfizer vaccine and educated on need for Booster(Moderna)  -POC educated on need for influenza vaccine. Pt has never had vaccine and declining it in pregnancy. -------------------------------------------------------------------------  Infection History:    1) Live with someone with TB/exposed to TB: No  2) Patient/partner has h/o genital herpes: No  3) Rash/viral illness since LMP:  No  4) History of STD:    No  5) Other: No  -------------------------------------------------------------------------

## 2022-03-08 ENCOUNTER — HOSPITAL ENCOUNTER (OUTPATIENT)
Age: 22
Discharge: HOME | End: 2022-03-08
Payer: COMMERCIAL

## 2022-03-08 DIAGNOSIS — Z32.01 POSITIVE PREGNANCY TEST: ICD-10-CM

## 2022-03-08 DIAGNOSIS — Z83.3 FAMILY HISTORY OF DIABETES MELLITUS IN FATHER: ICD-10-CM

## 2022-03-08 LAB
GLUCOSE ADMINISTRATION: NORMAL
GLUCOSE TOLERANCE SCREEN 50G: 102 MG/DL (ref 70–135)

## 2022-03-08 PROCEDURE — 82950 GLUCOSE TEST: CPT

## 2022-03-08 PROCEDURE — 36415 COLL VENOUS BLD VENIPUNCTURE: CPT

## 2022-03-16 ENCOUNTER — TELEPHONE (OUTPATIENT)
Dept: OBGYN CLINIC | Age: 22
End: 2022-03-16

## 2022-03-16 NOTE — TELEPHONE ENCOUNTER
Pt called she is 15w1d pregnant wondering if it is safe to take zicam I talked to  she said it is not recommended but let pt know she can use tylenol cold and flu

## 2022-03-23 ENCOUNTER — ROUTINE PRENATAL (OUTPATIENT)
Dept: OBGYN CLINIC | Age: 22
End: 2022-03-23

## 2022-03-23 VITALS
BODY MASS INDEX: 23.91 KG/M2 | HEART RATE: 91 BPM | SYSTOLIC BLOOD PRESSURE: 125 MMHG | WEIGHT: 135 LBS | DIASTOLIC BLOOD PRESSURE: 81 MMHG

## 2022-03-23 DIAGNOSIS — Z3A.16 16 WEEKS GESTATION OF PREGNANCY: Primary | ICD-10-CM

## 2022-03-23 PROCEDURE — 0502F SUBSEQUENT PRENATAL CARE: CPT | Performed by: STUDENT IN AN ORGANIZED HEALTH CARE EDUCATION/TRAINING PROGRAM

## 2022-03-23 NOTE — PROGRESS NOTES
Prenatal Visit    Coral Gonzalez is a 24 y.o. female  at 16w1d    Subjective: The patient was seen and evaluated. Reports Negative fetal movements. She denies abdominal pain, vaginal bleeding and leakage of fluid. Signs and symptoms of  labor as well as labor were reviewed. Dates were reviewed with the patient. Estimated Date of Delivery: 22          The patient declined the influenza vaccine this year. The problem list reflects the active issues addressed during today's visit    VITALS:  BP: 125/81  Weight: 135 lb (61.2 kg)  Pulse: 91  Fetal Heart Rate: 154  Movement: Absent       Assessment & Plan:  Coral Gonzalez is a 24 y.o. female  at 12w2d   - An 18-22 week anatomy ultrasound has been ordered   - The Nuchal Translucency testing was declined. - MSAFP was declined   - The ACIP recommended pregnant patients be included in phase 1C of vaccine distribution. This decision is supported by [facility] and ACOG. As of 2021, there have been over 30,000 pregnant patients included in the V-safe post COVID vaccination safety . Most (73%) reports to VAERS among pregnant women involved non-pregnancyspecific adverse events (e.g., local and systemic reactions). Miscarriage was the most frequently reported pregnancy-specific adverse event to VAERS; numbers are within the known background rates based on presumed COVID-19 vaccine doses administered to pregnant women. No unexpected pregnancy or infant outcomes have been observed related to  COVID-19 vaccination during pregnancy. Recommended patient proceed with vaccination. Booster pending. Patient Active Problem List    Diagnosis Date Noted    Irregular menses 2017     Return in about 4 weeks (around 2022) for DO Neal Richards Ob/Gyn   3/23/2022, 3:11 PM

## 2022-05-04 ENCOUNTER — ROUTINE PRENATAL (OUTPATIENT)
Dept: PERINATAL CARE | Age: 22
End: 2022-05-04
Payer: COMMERCIAL

## 2022-05-04 ENCOUNTER — ROUTINE PRENATAL (OUTPATIENT)
Dept: OBGYN CLINIC | Age: 22
End: 2022-05-04

## 2022-05-04 VITALS — DIASTOLIC BLOOD PRESSURE: 60 MMHG | BODY MASS INDEX: 25.92 KG/M2 | SYSTOLIC BLOOD PRESSURE: 128 MMHG | WEIGHT: 144 LBS

## 2022-05-04 VITALS
HEART RATE: 98 BPM | TEMPERATURE: 97.3 F | DIASTOLIC BLOOD PRESSURE: 72 MMHG | WEIGHT: 144 LBS | RESPIRATION RATE: 16 BRPM | BODY MASS INDEX: 25.52 KG/M2 | HEIGHT: 63 IN | SYSTOLIC BLOOD PRESSURE: 126 MMHG

## 2022-05-04 DIAGNOSIS — O35.BXX0 FETAL CARDIAC ECHOGENIC FOCUS, ANTEPARTUM, SINGLE OR UNSPECIFIED FETUS: Primary | ICD-10-CM

## 2022-05-04 DIAGNOSIS — Z36.86 ENCOUNTER FOR SCREENING FOR RISK OF PRE-TERM LABOR: ICD-10-CM

## 2022-05-04 DIAGNOSIS — O32.2XX0 TRANSVERSE LIE OF FETUS, SINGLE OR UNSPECIFIED FETUS: ICD-10-CM

## 2022-05-04 DIAGNOSIS — Z3A.22 22 WEEKS GESTATION OF PREGNANCY: ICD-10-CM

## 2022-05-04 DIAGNOSIS — J45.909 ASTHMA AFFECTING PREGNANCY IN SECOND TRIMESTER: ICD-10-CM

## 2022-05-04 DIAGNOSIS — O99.512 ASTHMA AFFECTING PREGNANCY IN SECOND TRIMESTER: ICD-10-CM

## 2022-05-04 DIAGNOSIS — Z3A.22 22 WEEKS GESTATION OF PREGNANCY: Primary | ICD-10-CM

## 2022-05-04 PROBLEM — N92.6 IRREGULAR MENSES: Status: RESOLVED | Noted: 2017-07-31 | Resolved: 2022-05-04

## 2022-05-04 LAB
ABDOMINAL CIRCUMFERENCE: NORMAL
ABDOMINAL CIRCUMFERENCE: NORMAL
BIPARIETAL DIAMETER: NORMAL
BIPARIETAL DIAMETER: NORMAL
ESTIMATED FETAL WEIGHT: NORMAL
ESTIMATED FETAL WEIGHT: NORMAL
FEMORAL DIAMETER: NORMAL
FEMORAL DIAMETER: NORMAL
HC/AC: NORMAL
HC/AC: NORMAL
HEAD CIRCUMFERENCE: NORMAL
HEAD CIRCUMFERENCE: NORMAL

## 2022-05-04 PROCEDURE — 76811 OB US DETAILED SNGL FETUS: CPT | Performed by: OBSTETRICS & GYNECOLOGY

## 2022-05-04 PROCEDURE — 99999 PR OFFICE/OUTPT VISIT,PROCEDURE ONLY: CPT | Performed by: OBSTETRICS & GYNECOLOGY

## 2022-05-04 PROCEDURE — 0502F SUBSEQUENT PRENATAL CARE: CPT | Performed by: STUDENT IN AN ORGANIZED HEALTH CARE EDUCATION/TRAINING PROGRAM

## 2022-05-04 PROCEDURE — 76817 TRANSVAGINAL US OBSTETRIC: CPT | Performed by: OBSTETRICS & GYNECOLOGY

## 2022-05-04 NOTE — PROGRESS NOTES
Prenatal Visit    Luz Bishop is a 24 y.o. female  at 22w1d    The patient was seen and evaluated. Reports positive fetal movements. She denies headache, vision changes, RUQ pain, contractions, vaginal bleeding and leakage of fluid. The patient declined the influenza vaccine this year. The problem list reflects the active issues addressed during today's visit    Vitals:  BP: 128/60  Weight: 144 lb (65.3 kg)  Fundal Height (cm): 22 cm  Fetal Heart Rate: 145     Labs: The patient is RH +, Rhogam not indicated  ABO/Rh   Date Value Ref Range Status   2022 A POSITIVE  Final         Assessment & Plan:  Luz Bishop is a 24 y.o. female  at 18w2d   - 29 week labs to be ordered at next visit   - Declined genetic screening   - The patients anatomy ultrasound has been completed and reviewed with patient.    - Next [facility] appointment 22   - The ACIP recommended pregnant patients be included in phase 1C of vaccine distribution. This decision is supported by [facility] and ACOG. As of 2021, there have been over 30,000 pregnant patients included in the V-safe post COVID vaccination safety . Most (73%) reports to VAERS among pregnant women involved non-pregnancyspecific adverse events (e.g., local and systemic reactions). Miscarriage was the most frequently reported pregnancy-specific adverse event to VAERS; numbers are within the known background rates based on presumed COVID-19 vaccine doses administered to pregnant women. No unexpected pregnancy or infant outcomes have been observed related to  COVID-19 vaccination during pregnancy. Recommended patient proceed with vaccination. Booster due.   - Echogenic focus on fetal heart. Declined NIPT today. Patient Active Problem List    Diagnosis Date Noted    Transverse fetal lie 2022     Priority: Medium     Repeat US at 28 weeks       Return in about 4 weeks (around 2022) for Ricardo Gomes 9038.         DO Bel Blankenship Ob/Gyn   5/4/2022, 2:14 PM

## 2022-05-04 NOTE — PROGRESS NOTES
Patient declined both invasive prenatal diagnostic testing and non-invasive prenatal testing (NIPT/NIPS) today. Patient declined invasive prenatal diagnostic testing today (including for evaluation and testing for fetal aneuploidy, fetal microdeletions, fetal single gene disorders, fetal microarray testing, etc). Patient has declined non-invasive prenatal diagnosis testing (with the [place] Complete test from [facility]) today. Patient has declined the Five Gene Carrier Screen (with the [place] test from [address] [place] [address]) today. MSAFP (maternal serum alpha-feto protein level) lab draw also declined by patient. Advise repeat 1-hour glucola between 24-28 weeks' gestation to evaluate for gestational diabetes. Patient declines Maternal-Fetal Medicine consultation at this time regarding the medical/obstetrical complications of pregnancy. Maternal-Fetal Medicine (MFM) attending physician will defer all management for the medical/obstetrical complications of pregnancy to the primary attending obstetrical physician, as a result. Therefore, only an ultrasound evaluation was completed today in the MFM office.

## 2022-05-06 PROBLEM — O35.BXX0 ECHOGENIC FOCUS OF HEART OF FETUS AFFECTING ANTEPARTUM CARE OF MOTHER: Status: ACTIVE | Noted: 2022-05-06

## 2022-05-20 ENCOUNTER — TELEPHONE (OUTPATIENT)
Dept: OBGYN CLINIC | Age: 22
End: 2022-05-20

## 2022-05-20 NOTE — TELEPHONE ENCOUNTER
Pt called she is pregnant and wondering if it is okay to use hydrocortisone cream spoke with  she said it was okay

## 2022-06-07 ENCOUNTER — ROUTINE PRENATAL (OUTPATIENT)
Dept: OBGYN CLINIC | Age: 22
End: 2022-06-07

## 2022-06-07 VITALS — DIASTOLIC BLOOD PRESSURE: 62 MMHG | WEIGHT: 148.6 LBS | BODY MASS INDEX: 26.75 KG/M2 | SYSTOLIC BLOOD PRESSURE: 104 MMHG

## 2022-06-07 DIAGNOSIS — O32.2XX0 TRANSVERSE LIE OF FETUS, SINGLE OR UNSPECIFIED FETUS: ICD-10-CM

## 2022-06-07 DIAGNOSIS — Z3A.27 27 WEEKS GESTATION OF PREGNANCY: Primary | ICD-10-CM

## 2022-06-07 DIAGNOSIS — Z34.02 SUPERVISION OF NORMAL FIRST PREGNANCY IN SECOND TRIMESTER: ICD-10-CM

## 2022-06-07 PROCEDURE — 0502F SUBSEQUENT PRENATAL CARE: CPT | Performed by: NURSE PRACTITIONER

## 2022-06-07 NOTE — PATIENT INSTRUCTIONS
Patient Education        Weeks 26 to 30 of Your Pregnancy: Care Instructions  Overview     You are now entering your last trimester of pregnancy. Your baby is growing quickly. Lui Suarez probably feel your baby moving around more often. Your doctormay ask you to count your baby's kicks. Your back may ache as your body gets used to your baby's size and length. If you haven't already had the Tdap shot during this pregnancy, talk to your doctor about getting it. It will help protect your  against pertussisinfection. During this time, it's important to take care of yourself and pay attention to what your body needs. If you feel sexual, you can explore ways to be close withyour partner that match your comfort and desire. Follow-up care is a key part of your treatment and safety. Be sure to make and go to all appointments, and call your doctor if you are having problems. It's also a good idea to know your test results and keep alist of the medicines you take. How can you care for yourself at home? Take it easy at work   Take frequent breaks. If possible, stop working when you are tired, and rest during your lunch hour.  Take bathroom breaks every 2 hours.  Change positions often. If you sit for long periods, stand up and walk around.  When you stand for a long time, keep one foot on a low stool with your knee bent. After standing a lot, sit with your feet up.  Avoid fumes, chemicals, and tobacco smoke. Be sexual in your own way   Having sex during pregnancy is okay, unless your doctor tells you not to.  You may be very interested in sex, or you may have no interest at all.  Your growing belly can make it hard to find a good position during intercourse. [place] and explore.  You may get cramps in your uterus when your partner touches your breasts.  A back rub may relieve the backache or cramps that sometimes follow orgasm. Learn about  labor   Watch for signs of  labor.  You may be going into labor if:  ? You have menstrual-like cramps, with or without nausea. ? You have about 6 or more contractions in 1 hour, even after you have had a glass of water and are resting. ? You have a low, dull backache that does not go away when you change your position. ? You have pain or pressure in your pelvis that comes and goes in a pattern. ? You have intestinal cramping or flu-like symptoms, with or without diarrhea.  ? You notice an increase or change in your vaginal discharge. Discharge may be heavy, mucus-like, watery, or streaked with blood. ? Your water breaks.  If you think you have  labor:  ? Drink 2 or 3 glasses of water or juice. Not drinking enough fluids can cause contractions. ? Stop what you are doing, and empty your bladder. Then lie down on your left side for at least 1 hour. ? While lying on your side, find your breast bone. Put your fingers in the soft spot just below it. Move your fingers down toward your belly button to find the top of your uterus. Check to see if it is tight. ? Contractions can be weak or strong. Record your contractions for an hour. Time a contraction from the start of one contraction to the start of the next one.  ? Single or several strong contractions without a pattern are called [place] contractions. They are practice contractions but not the start of labor. They often stop if you change what you are doing. ? Call your doctor if you have regular contractions. Where can you learn more? Go to https://PinMyPetwilli.healthiSnap. org and sign in to your [employer] account. Enter H133 in the [facility] box to learn more about \"Weeks 26 to 30 of Your Pregnancy: Care Instructions. \"     If you do not have an account, please click on the \"Sign Up Now\" link. Current as of: 2021               Content Version: 13.2  © 4756-2145 Healthwise, Incorporated. Care instructions adapted under license by [facility] ([facility]).  If you have questions about a medical condition or this instruction, always ask your healthcare professional. [address] any warranty or liability for your use of this information. Patient Education        Counting Your Baby's Kicks: Care Instructions  Overview     Counting your baby's kicks is one way your doctor can tell that your baby is healthy. Most women--especially in a first pregnancy--feel their baby move for the first time between 16 and 22 weeks. The movement may feel like flutters rather than kicks. Your baby may move more at certain times of the day. When you are active, you may notice less kicking than when you are resting. At yourprenatal visits, your doctor will ask whether the baby is active. In your last trimester, your doctor may ask you to count the number of timesyou feel your baby move. Follow-up care is a key part of your treatment and safety. Be sure to make and go to all appointments, and call your doctor if you are having problems. It's also a good idea to know your test results and keep alist of the medicines you take. How do you count fetal kicks?  A common method of checking your baby's movement is to note the length of time it takes to count ten movements (such as kicks, flutters, or rolls).  Pick your baby's most active time of day to count. This may be any time from morning to evening.  If you don't feel 10 movements in an hour, have something to eat or drink and count for another hour. If you don't feel at least 10 movements in the 2-hour period, call your doctor. When should you call for help? Call your doctor now or seek immediate medical care if:     You noticed that your baby has stopped moving or is moving much less than normal.   Watch closely for changes in your health, and be sure to contact your doctor ifyou have any problems. Where can you learn more? Go to https://estelita.InVasc Therapeutics. org and sign in to your [employer] account.  Enter K905 in the [facility] box to learn more about \"Counting Your Baby's Kicks: Care Instructions. \"     If you do not have an account, please click on the \"Sign Up Now\" link. Current as of: June 16, 2021               Content Version: 13.2  © 2006-2022 [email or web address]. Care instructions adapted under license by [facility] ([facility]). If you have questions about a medical condition or this instruction, always ask your healthcare professional. [place] [address] any warranty or liability for your use of this information. Patient Education        Learning About When to Call Your Doctor During Pregnancy (After 20 Weeks)  Overview  It's common to have concerns about what might be a problem when you're pregnant. Most pregnancies don't have any serious problems. But it's still important to know when to call your doctor if you have certain symptoms orsigns of labor. These are general suggestions. Your doctor may give you some more informationabout when to call. When to call your doctor (after 20 weeks)  Call 911 anytime you think you may need emergency care. For example, call if:   You have severe vaginal bleeding.  You have sudden, severe pain in your belly.  You passed out (lost consciousness).  You have a seizure.  You see or feel the umbilical cord.  You think you are about to deliver your baby and can't make it safely to the hospital.  Call your doctor now or seek immediate medical care if:   You have vaginal bleeding.  You have belly pain.  You have a fever.  You have symptoms of preeclampsia, such as:  ? Sudden swelling of your face, hands, or feet. ? New vision problems (such as dimness, blurring, or seeing spots). ? A severe headache.  You have a sudden release of fluid from your vagina. (You think your water broke.)   You think that you may be in labor. This means that you've had at least 6 contractions in an hour.    You notice that your baby has stopped moving or is moving much less than normal.   You have symptoms of a urinary tract infection. These may include:  ? Pain or burning when you urinate. ? A frequent need to urinate without being able to pass much urine. ? Pain in the flank, which is just below the rib cage and above the waist on either side of the back. ? Blood in your urine. Watch closely for changes in your health, and be sure to contact your doctor if:   You have vaginal discharge that smells bad.  You have skin changes, such as:  ? A rash. ? Itching. ? Yellow color to your skin.  You have other concerns about your pregnancy. If you have labor signs at 37 weeks or more  If you have signs of labor at 37 weeks or more, your doctor may tell you tocall when your labor becomes more active. Symptoms of active labor include:   Contractions that are regular.  Contractions that are less than 5 minutes apart.  Contractions that are hard to talk through. Follow-up care is a key part of your treatment and safety. Be sure to make and go to all appointments, and call your doctor if you are having problems. It's also a good idea to know your test results and keep alist of the medicines you take. Where can you learn more? Go to https://Fleetglobal - [employer] na Ãƒ?rea das Frotaspepiceweb.Estate Assist. org and sign in to your [employer] account. Enter  in the [facility] box to learn more about \"Learning About When to Call Your Doctor During Pregnancy (After 20 Weeks). \"     If you do not have an account, please click on the \"Sign Up Now\" link. Current as of: June 16, 2021               Content Version: 13.2  © 2006-2022 Healthwise, Incorporated. Care instructions adapted under license by [facility] ([facility]). If you have questions about a medical condition or this instruction, always ask your healthcare professional. [place] [address] any warranty or liability for your use of this information.

## 2022-06-07 NOTE — PROGRESS NOTES
+FM, -Ctx, -LOF, -VB  Patient Active Problem List   Diagnosis    Transverse fetal lie    Echogenic focus of heart of fetus affecting antepartum care of mother     Blood pressure 104/62, weight 148 lb 9.6 oz (67.4 kg), last menstrual period 11/30/2021, not currently breastfeeding.   Reviewed kick counts, labor and pre eclampsia precautions  Feeling well  Good FM  28 week labs ordered, explained to pt  tDAP next visit  No concerns voiced at this time

## 2022-06-15 ENCOUNTER — HOSPITAL ENCOUNTER (OUTPATIENT)
Age: 22
Discharge: HOME | End: 2022-06-15
Payer: COMMERCIAL

## 2022-06-15 DIAGNOSIS — Z3A.27 27 WEEKS GESTATION OF PREGNANCY: ICD-10-CM

## 2022-06-15 DIAGNOSIS — Z34.02 SUPERVISION OF NORMAL FIRST PREGNANCY IN SECOND TRIMESTER: ICD-10-CM

## 2022-06-15 LAB
ABSOLUTE EOS #: 0.1 K/UL (ref 0–0.44)
ABSOLUTE IMMATURE GRANULOCYTE: 0.09 K/UL (ref 0–0.3)
ABSOLUTE LYMPH #: 1.29 K/UL (ref 1.1–3.7)
ABSOLUTE MONO #: 0.66 K/UL (ref 0.1–1.4)
BASOPHILS # BLD: 1 % (ref 0–2)
BASOPHILS ABSOLUTE: 0.06 K/UL (ref 0–0.2)
EOSINOPHILS RELATIVE PERCENT: 1 % (ref 1–4)
GLUCOSE ADMINISTRATION: NORMAL
GLUCOSE TOLERANCE SCREEN 50G: 106 MG/DL (ref 70–135)
HCT VFR BLD CALC: 33.8 % (ref 36.3–47.1)
HEMOGLOBIN: 10.7 G/DL (ref 11.9–15.1)
IMMATURE GRANULOCYTES: 1 %
LYMPHOCYTES # BLD: 12 % (ref 25–45)
MCH RBC QN AUTO: 28.2 PG (ref 25.2–33.5)
MCHC RBC AUTO-ENTMCNC: 31.7 G/DL (ref 28.4–34.8)
MCV RBC AUTO: 88.9 FL (ref 82.6–102.9)
MONOCYTES # BLD: 6 % (ref 2–8)
NRBC AUTOMATED: 0 PER 100 WBC
PDW BLD-RTO: 13 % (ref 11.8–14.4)
PLATELET # BLD: 271 K/UL (ref 138–453)
PMV BLD AUTO: 11.1 FL (ref 8.1–13.5)
RBC # BLD: 3.8 M/UL (ref 3.95–5.11)
SEG NEUTROPHILS: 79 % (ref 34–64)
SEGMENTED NEUTROPHILS ABSOLUTE COUNT: 8.71 K/UL (ref 1.5–8.1)
WBC # BLD: 10.9 K/UL (ref 4.5–13.5)

## 2022-06-15 PROCEDURE — 85025 COMPLETE CBC W/AUTO DIFF WBC: CPT

## 2022-06-15 PROCEDURE — 36415 COLL VENOUS BLD VENIPUNCTURE: CPT

## 2022-06-15 PROCEDURE — 82950 GLUCOSE TEST: CPT

## 2022-06-21 ENCOUNTER — TELEPHONE (OUTPATIENT)
Dept: OBGYN CLINIC | Age: 22
End: 2022-06-21

## 2022-06-21 NOTE — TELEPHONE ENCOUNTER
Pt has body ache sore throat  and congestion  and fevers and cramping from covid I recommended pt stay hydrated get rest may take tylenol for fevers and aches and watch fetal movement if decreases go to ER to be evaluated

## 2022-06-29 ENCOUNTER — ROUTINE PRENATAL (OUTPATIENT)
Dept: PERINATAL CARE | Age: 22
End: 2022-06-29
Payer: COMMERCIAL

## 2022-06-29 VITALS
SYSTOLIC BLOOD PRESSURE: 127 MMHG | HEIGHT: 63 IN | WEIGHT: 149 LBS | RESPIRATION RATE: 16 BRPM | HEART RATE: 98 BPM | TEMPERATURE: 97.9 F | BODY MASS INDEX: 26.4 KG/M2 | DIASTOLIC BLOOD PRESSURE: 77 MMHG

## 2022-06-29 DIAGNOSIS — Z3A.30 30 WEEKS GESTATION OF PREGNANCY: ICD-10-CM

## 2022-06-29 DIAGNOSIS — J45.909 ASTHMA AFFECTING PREGNANCY IN THIRD TRIMESTER: ICD-10-CM

## 2022-06-29 DIAGNOSIS — O99.513 ASTHMA AFFECTING PREGNANCY IN THIRD TRIMESTER: ICD-10-CM

## 2022-06-29 DIAGNOSIS — O35.BXX0 FETAL CARDIAC ECHOGENIC FOCUS, ANTEPARTUM, SINGLE OR UNSPECIFIED FETUS: Primary | ICD-10-CM

## 2022-06-29 DIAGNOSIS — Z36.4 ANTENATAL SCREENING FOR FETAL GROWTH RETARDATION USING ULTRASONICS: ICD-10-CM

## 2022-06-29 DIAGNOSIS — Z13.89 ENCOUNTER FOR ROUTINE SCREENING FOR MALFORMATION USING ULTRASONICS: ICD-10-CM

## 2022-06-29 PROCEDURE — 99999 PR OFFICE/OUTPT VISIT,PROCEDURE ONLY: CPT | Performed by: OBSTETRICS & GYNECOLOGY

## 2022-06-29 PROCEDURE — 76819 FETAL BIOPHYS PROFIL W/O NST: CPT | Performed by: OBSTETRICS & GYNECOLOGY

## 2022-06-29 NOTE — PROGRESS NOTES
Patient declined invasive prenatal diagnostic testing again today (including for evaluation and testing for fetal aneuploidy, fetal microdeletions, fetal single gene disorders, fetal microarray testing, etc). Patient has declined non-invasive prenatal diagnosis testing ([facility] again today. Patient has declined the Five Gene Carrier Screen (with the Shellman test from [address] [place] [address]) again today.

## 2022-07-07 ENCOUNTER — ROUTINE PRENATAL (OUTPATIENT)
Dept: OBGYN CLINIC | Age: 22
End: 2022-07-07
Payer: COMMERCIAL

## 2022-07-07 VITALS
DIASTOLIC BLOOD PRESSURE: 69 MMHG | BODY MASS INDEX: 26.68 KG/M2 | SYSTOLIC BLOOD PRESSURE: 117 MMHG | WEIGHT: 150.6 LBS | HEART RATE: 91 BPM

## 2022-07-07 DIAGNOSIS — Z23 NEED FOR TDAP VACCINATION: ICD-10-CM

## 2022-07-07 DIAGNOSIS — Z83.3 FH: TYPE 2 DIABETES: ICD-10-CM

## 2022-07-07 DIAGNOSIS — Z3A.31 31 WEEKS GESTATION OF PREGNANCY: Primary | ICD-10-CM

## 2022-07-07 DIAGNOSIS — O32.2XX0 TRANSVERSE LIE OF FETUS, SINGLE OR UNSPECIFIED FETUS: ICD-10-CM

## 2022-07-07 DIAGNOSIS — Z34.02 SUPERVISION OF NORMAL FIRST PREGNANCY IN SECOND TRIMESTER: ICD-10-CM

## 2022-07-07 PROCEDURE — 0502F SUBSEQUENT PRENATAL CARE: CPT | Performed by: STUDENT IN AN ORGANIZED HEALTH CARE EDUCATION/TRAINING PROGRAM

## 2022-07-07 PROCEDURE — 90471 IMMUNIZATION ADMIN: CPT | Performed by: STUDENT IN AN ORGANIZED HEALTH CARE EDUCATION/TRAINING PROGRAM

## 2022-07-07 PROCEDURE — 90715 TDAP VACCINE 7 YRS/> IM: CPT | Performed by: STUDENT IN AN ORGANIZED HEALTH CARE EDUCATION/TRAINING PROGRAM

## 2022-07-07 NOTE — PROGRESS NOTES
Prenatal Visit    Miguelina Sanchez is a 24 y.o. female  at 32w1d    The patient was seen and evaluated. Reports positive fetal movements. She denies headache, vision changes, RUQ pain, contractions, vaginal bleeding and leakage of fluid. The patient was instructed on fetal kick counts and was given a kick sheet to complete every 8 hours. She was instructed that the baby should move at a minimum of ten times within one hour after a meal. The patient was instructed to lay down on her left side twenty minutes after eating and count movements for up to one hour with a target value of ten movements. She was instructed to notify the office if she did not make that target after two attempts or if after any attempt there was less than four movements. The patient admits to that the targets have been made. The patient requested the T-Dap Vaccine (27-36 weeks) this pregnancy. The patient declined the influenza vaccine this year. The problem list reflects the active issues addressed during today's visit    Vitals:    BP: 117/69  Weight: 150 lb 9.6 oz (68.3 kg)  Heart Rate: 91  Fundal Height (cm): 31 cm  Fetal Heart Rate: 135  Movement: Present     28 Week Labs: The patient is RH +, Rhogam not indicated  ABO/Rh   Date Value Ref Range Status   2022 A POSITIVE  Final       1hr GTT: 106   28 week CBC:   Lab Results   Component Value Date    WBC 10.9 06/15/2022    HGB 10.7 (L) 06/15/2022    HCT 33.8 (L) 06/15/2022    MCV 88.9 06/15/2022     06/15/2022     UA w/ Ur C&S: neg     Assessment & Plan:  Miguelina Sanchez is a 24 y.o. female  at 31w2d   - 28 week labs completed   - discussed recommendations for TDAP immunization, patient requested TDAP. - The ACIP recommended pregnant patients be included in phase 1C of vaccine distribution. This decision is supported by [facility] and ACOG.  As of 2021, there have been over 30,000 pregnant patients included in the V-safe post COVID vaccination

## 2022-07-17 ENCOUNTER — NURSE TRIAGE (OUTPATIENT)
Dept: OBGYN | Facility: CLINIC | Age: 22
End: 2022-07-17

## 2022-07-18 NOTE — TELEPHONE ENCOUNTER
Pt calls to report bleeding after intercourse. Pt is 8 months pregnant. Pt denies any other issues. Page sent to on call physician, Dimas Paniagua. Physician returns call to [employer]. Pt info and phone number given to physician. Reason for Disposition   Health Information question, no triage required and triager able to answer question    Protocols used:  Information Only Call - No Triage-ADULT-

## 2022-07-21 ENCOUNTER — ROUTINE PRENATAL (OUTPATIENT)
Dept: OBGYN CLINIC | Age: 22
End: 2022-07-21

## 2022-07-21 VITALS — BODY MASS INDEX: 27.1 KG/M2 | WEIGHT: 153 LBS | DIASTOLIC BLOOD PRESSURE: 74 MMHG | SYSTOLIC BLOOD PRESSURE: 122 MMHG

## 2022-07-21 DIAGNOSIS — Z34.03 ENCOUNTER FOR SUPERVISION OF NORMAL FIRST PREGNANCY IN THIRD TRIMESTER: ICD-10-CM

## 2022-07-21 DIAGNOSIS — Z3A.33 33 WEEKS GESTATION OF PREGNANCY: Primary | ICD-10-CM

## 2022-07-21 DIAGNOSIS — O35.BXX0 ECHOGENIC FOCUS OF HEART OF FETUS AFFECTING ANTEPARTUM CARE OF MOTHER, SINGLE OR UNSPECIFIED FETUS: ICD-10-CM

## 2022-07-21 PROCEDURE — 0502F SUBSEQUENT PRENATAL CARE: CPT | Performed by: STUDENT IN AN ORGANIZED HEALTH CARE EDUCATION/TRAINING PROGRAM

## 2022-07-21 NOTE — PROGRESS NOTES
Prenatal Visit    Debi Foreman is a 25 y.o. female  at 33w2d    The patient was seen and evaluated. Reports positive fetal movements. She denies headache, vision changes, RUQ pain, contractions, vaginal bleeding and leakage of fluid. The patient was instructed on fetal kick counts and was given a kick sheet to complete every 8 hours. She was instructed that the baby should move at a minimum of ten times within one hour after a meal. The patient was instructed to lay down on her left side twenty minutes after eating and count movements for up to one hour with a target value of ten movements. She was instructed to notify the office if she did not make that target after two attempts or if after any attempt there was less than four movements. The patient reports that the targets have been made. The patient already received the T-Dap Vaccine (27-36 weeks) this pregnancy. The patient declined the influenza vaccine this year. The problem list reflects the active issues addressed during today's visit    Vitals:    BP: 122/74  Weight: 153 lb (69.4 kg)  Fundal Height (cm): 33 cm  Fetal HR: 127  Movement: Present     28 Week Labs: The patient is RH +, Rhogam not indicated  ABO/Rh   Date Value Ref Range Status   2022 A POSITIVE  Final       1hr GTT: 106   28 week CBC:   Lab Results   Component Value Date    WBC 10.9 06/15/2022    HGB 10.7 (L) 06/15/2022    HCT 33.8 (L) 06/15/2022    MCV 88.9 06/15/2022     06/15/2022     UA w/ Ur C&S: neg     Assessment & Plan:  Debi Foreman is a 25 y.o. female  at 33w2d   - 28 week labs completed   - discussed recommendations for TDAP immunization, patient already received TDAP. - The ACIP recommended pregnant patients be included in phase 1C of vaccine distribution. This decision is supported by [facility] and ACOG. As of 2021, there have been over 30,000 pregnant patients included in the V-safe post COVID vaccination safety .  Most (73%) reports to VAERS among pregnant women involved non-pregnancyspecific adverse events (e.g., local and systemic reactions). Miscarriage was the most frequently reported pregnancy-specific adverse event to VAERS; numbers are within the known background rates based on presumed COVID-19 vaccine doses administered to pregnant women. No unexpected pregnancy or infant outcomes have been observed related to  COVID-19 vaccination during pregnancy. Recommended patient proceed with vaccination. Booster pending.   - Declined NIPT for Echogenic Hear Focus        Patient Active Problem List    Diagnosis Date Noted    Echogenic focus of heart of fetus affecting antepartum care of mother 05/06/2022     Priority: Medium     Declining NIPT      Transverse fetal lie (RSLVD) 05/04/2022     Priority: Medium     [facility] 1/0646: Cephalic       Return in about 2 weeks (around 8/4/2022) for Parva Eliseo 9038.           DO Neal Delarosa Ob/Gyn   7/21/2022, 11:02 AM

## 2022-08-02 PROBLEM — Z34.90 SUPERVISION OF NORMAL PREGNANCY: Status: ACTIVE | Noted: 2022-08-02

## 2022-08-03 ENCOUNTER — ROUTINE PRENATAL (OUTPATIENT)
Dept: OBGYN CLINIC | Age: 22
End: 2022-08-03

## 2022-08-03 ENCOUNTER — HOSPITAL ENCOUNTER (OUTPATIENT)
Age: 22
Setting detail: SPECIMEN
Discharge: HOME | End: 2022-08-03

## 2022-08-03 VITALS
WEIGHT: 153.6 LBS | HEART RATE: 98 BPM | DIASTOLIC BLOOD PRESSURE: 77 MMHG | BODY MASS INDEX: 27.21 KG/M2 | SYSTOLIC BLOOD PRESSURE: 136 MMHG

## 2022-08-03 DIAGNOSIS — Z34.93 PRENATAL CARE, THIRD TRIMESTER: Primary | ICD-10-CM

## 2022-08-03 DIAGNOSIS — Z34.93 PRENATAL CARE, THIRD TRIMESTER: ICD-10-CM

## 2022-08-03 DIAGNOSIS — O26.86 PUPP (PRURITIC URTICARIAL PAPULES AND PLAQUES OF PREGNANCY): ICD-10-CM

## 2022-08-03 PROCEDURE — 0502F SUBSEQUENT PRENATAL CARE: CPT | Performed by: OBSTETRICS & GYNECOLOGY

## 2022-08-03 NOTE — PROGRESS NOTES
Yuri Hankins is a  @ 35w1d who presents for DIANA visit. She denies VB.  + FM. She is having some leaking that started yesterday and has happened twice. No leaking today though. She is having ctxs q 10-20 min. She has a rash on her breasts and abdomen and it is itchy. She has been using hydrocortisone cream and that has been helping. She denies any fevers/chills, SOB, cough, sore throat, loss of taste/smell or sick contacts. Pt denies any HA, vision changes or RUQ pain. O:  Vitals:    22 1021   BP: 136/77   Pulse: 98     Gen: NAD  Abd: soft, nontender, gravid  Ext:  no edema      BP: 136/77  Weight: 153 lb 9.6 oz (69.7 kg)  Heart Rate: 98  Patient Position: Sitting  Fundal Height (cm): 34 cm  Fetal HR: 140  Movement: Present  Dilation (cm): Closed  Effacement: 0  Station: -2  Neg pooling, ferning and nitrazine  Abd: rash with small papules on the abdomen and breast    A/P:  Patient Active Problem List    Diagnosis Date Noted    Supervision of normal pregnancy 2022     Priority: Medium    Echogenic focus of heart of fetus affecting antepartum care of mother 2022     Declining NIPT      Transverse fetal lie (RSLVD) 2022     [facility] : Cephalic       Discussed updated COVID precautions and policies. Reviewed updated visitor policy. Encouraged social distancing and appropriate hand washing/hygiene practices. Reviewed symptoms suspicious for COVID infection. Discussed that ACOG, [facility], and the CDC recommend to not withold immunization in pregnant and breastfeeding women who meet criteria for receipt of the vaccine based on the ACIP recommended priority groups. All questions answered. Patient vocalized understanding.     GBS next visit  PPROM unlikely with findings  Discussed s/sx that should prompt call to the office  Discussed kick counts  RTC in 1 wks    Janet Vang MD

## 2022-08-06 LAB
CULTURE: NORMAL
SPECIMEN DESCRIPTION: NORMAL

## 2022-08-11 ENCOUNTER — ROUTINE PRENATAL (OUTPATIENT)
Dept: OBGYN CLINIC | Age: 22
End: 2022-08-11

## 2022-08-11 VITALS — DIASTOLIC BLOOD PRESSURE: 60 MMHG | WEIGHT: 155.8 LBS | SYSTOLIC BLOOD PRESSURE: 102 MMHG | BODY MASS INDEX: 27.6 KG/M2

## 2022-08-11 DIAGNOSIS — Z3A.36 36 WEEKS GESTATION OF PREGNANCY: Primary | ICD-10-CM

## 2022-08-11 DIAGNOSIS — Z34.03 ENCOUNTER FOR SUPERVISION OF NORMAL FIRST PREGNANCY IN THIRD TRIMESTER: ICD-10-CM

## 2022-08-11 DIAGNOSIS — O35.BXX0 ECHOGENIC FOCUS OF HEART OF FETUS AFFECTING ANTEPARTUM CARE OF MOTHER, SINGLE OR UNSPECIFIED FETUS: ICD-10-CM

## 2022-08-11 PROCEDURE — 0502F SUBSEQUENT PRENATAL CARE: CPT | Performed by: STUDENT IN AN ORGANIZED HEALTH CARE EDUCATION/TRAINING PROGRAM

## 2022-08-11 NOTE — PROGRESS NOTES
Prenatal Visit    Mare Baxter is a 25 y.o. female  at 37w1d    The patient was seen and evaluated. Reports positive fetal movements. She denies headache, vision changes, RUQ pain, contractions, vaginal bleeding and leakage of fluid. The patient was instructed on fetal kick counts and was given a kick sheet to complete every 8 hours. She was instructed that the baby should move at a minimum of ten times within one hour after a meal. The patient was instructed to lay down on her left side twenty minutes after eating and count movements for up to one hour with a target value of ten movements. She was instructed to notify the office if she did not make that target after two attempts or if after any attempt there was less than four movements. The patient reports that the targets have been made. The patient already received the T-Dap Vaccine (27-36 weeks) this pregnancy. The problem list reflects the active issues addressed during today's visit. Allergies:  No Known Allergies    Vitals:    BP: 102/60  Weight: 155 lb 12.8 oz (70.7 kg)  Fundal Height (cm): 36 cm  Fetal HR: 150  Movement: Present     Labs:  Group Beta Strep collection was done. Sensitivities for clindamycin and erythromycin were not ordered. Assessment & Plan:  Mare Baxter is a 25 y.o. female  at 37w1d   - GBS testing was ordered and found to be negative. Patient Active Problem List    Diagnosis Date Noted    PUPP (pruritic urticarial papules and plaques of pregnancy) 2022     Priority: Medium    Supervision of normal pregnancy 2022     Priority: Medium    Echogenic focus of heart of fetus affecting antepartum care of mother 2022     Priority: Medium     Declining NIPT      Transverse fetal lie (RSLVD) 2022     Priority: Medium     [facility] 142: Cephalic        - The ACIP recommended pregnant patients be included in phase 1C of vaccine distribution. This decision is supported by [facility] and ACOG. As of February 16, 2021, there have been over 30,000 pregnant patients included in the V-safe post COVID vaccination safety . Most (73%) reports to VAERS among pregnant women involved non-pregnancyspecific adverse events (e.g., local and systemic reactions). Miscarriage was the most frequently reported pregnancy-specific adverse event to VAERS; numbers are within the known background rates based on presumed COVID-19 vaccine doses administered to pregnant women. No unexpected pregnancy or infant outcomes have been observed related to  COVID-19 vaccination during pregnancy. Recommended patient proceed with vaccination. Return in about 1 week (around 8/18/2022) for Ricardo Gomes 9038.         DO Neal Gonzalez Ob/Gyn   8/11/2022, 10:55 AM

## 2022-08-18 ENCOUNTER — ROUTINE PRENATAL (OUTPATIENT)
Dept: OBGYN CLINIC | Age: 22
End: 2022-08-18

## 2022-08-18 VITALS — DIASTOLIC BLOOD PRESSURE: 80 MMHG | BODY MASS INDEX: 27.92 KG/M2 | WEIGHT: 157.6 LBS | SYSTOLIC BLOOD PRESSURE: 110 MMHG

## 2022-08-18 DIAGNOSIS — Z3A.37 37 WEEKS GESTATION OF PREGNANCY: Primary | ICD-10-CM

## 2022-08-18 DIAGNOSIS — Z34.03 ENCOUNTER FOR SUPERVISION OF NORMAL FIRST PREGNANCY IN THIRD TRIMESTER: ICD-10-CM

## 2022-08-18 DIAGNOSIS — O35.BXX0 ECHOGENIC FOCUS OF HEART OF FETUS AFFECTING ANTEPARTUM CARE OF MOTHER, SINGLE OR UNSPECIFIED FETUS: ICD-10-CM

## 2022-08-18 PROCEDURE — 0502F SUBSEQUENT PRENATAL CARE: CPT | Performed by: STUDENT IN AN ORGANIZED HEALTH CARE EDUCATION/TRAINING PROGRAM

## 2022-08-18 NOTE — PROGRESS NOTES
Prenatal Visit    Miguelina Sanchez is a 25 y.o. female  at 42w2d    The patient was seen and evaluated. Reports positive fetal movements. She denies headache, vision changes, RUQ pain, contractions, vaginal bleeding and leakage of fluid. The patient was instructed on fetal kick counts and was given a kick sheet to complete every 8 hours. She was instructed that the baby should move at a minimum of ten times within one hour after a meal. The patient was instructed to lay down on her left side twenty minutes after eating and count movements for up to one hour with a target value of ten movements. She was instructed to notify the office if she did not make that target after two attempts or if after any attempt there was less than four movements. The patient reports that the targets have been made. The patient already received the T-Dap Vaccine (27-36 weeks) this pregnancy. The patient declined the influenza vaccine this year. The problem list reflects the active issues addressed during today's visit. Allergies:  Patient has no known allergies. Vitals:    BP: 110/80  Weight: 157 lb 9.6 oz (71.5 kg)  Fundal Height (cm): 37 cm  Fetal HR: 131  Movement: Present     Physical Exam:  SVE: N/A  Labs:  Group Beta Strep collection was done. Sensitivities for clindamycin and erythromycin were not ordered. The patient was found to be GBS: negative    Assessment & Plan:  Miguelina Sanchez is a 25 y.o. female  at [address]   - The ACIP recommended pregnant patients be included in phase 1C of vaccine distribution. This decision is supported by [facility] and ACOG. As of 2021, there have been over 30,000 pregnant patients included in the V-safe post COVID vaccination safety . Most (73%) reports to VAERS among pregnant women involved non-pregnancyspecific adverse events (e.g., local and systemic reactions).  Miscarriage was the most frequently reported pregnancy-specific adverse event to VAERS; numbers are within the known background rates based on presumed COVID-19 vaccine doses administered to pregnant women. No unexpected pregnancy or infant outcomes have been observed related to  COVID-19 vaccination during pregnancy. Recommended patient proceed with vaccination. Booster pending. Patient Active Problem List    Diagnosis Date Noted    PUPP (pruritic urticarial papules and plaques of pregnancy) 08/03/2022     Priority: Medium    Supervision of normal pregnancy 08/02/2022     Priority: Medium    Echogenic focus of heart of fetus affecting antepartum care of mother 05/06/2022     Priority: Medium     Declining NIPT      Transverse fetal lie (RSLVD) 05/04/2022     Priority: Medium     [facility]  1/8648: Cephalic       Return in about 1 week (around 8/25/2022) for KEYA Almaraz, [address] [facility]   8/18/2022, 3:37 PM

## 2022-08-25 ENCOUNTER — ROUTINE PRENATAL (OUTPATIENT)
Dept: OBGYN CLINIC | Age: 22
End: 2022-08-25

## 2022-08-25 VITALS
BODY MASS INDEX: 27.82 KG/M2 | DIASTOLIC BLOOD PRESSURE: 74 MMHG | SYSTOLIC BLOOD PRESSURE: 114 MMHG | WEIGHT: 157 LBS | HEIGHT: 63 IN

## 2022-08-25 DIAGNOSIS — Z34.03 ENCOUNTER FOR SUPERVISION OF NORMAL FIRST PREGNANCY IN THIRD TRIMESTER: ICD-10-CM

## 2022-08-25 DIAGNOSIS — O26.86 PUPP (PRURITIC URTICARIAL PAPULES AND PLAQUES OF PREGNANCY): ICD-10-CM

## 2022-08-25 DIAGNOSIS — O35.BXX0 ECHOGENIC FOCUS OF HEART OF FETUS AFFECTING ANTEPARTUM CARE OF MOTHER, SINGLE OR UNSPECIFIED FETUS: ICD-10-CM

## 2022-08-25 DIAGNOSIS — Z3A.38 38 WEEKS GESTATION OF PREGNANCY: Primary | ICD-10-CM

## 2022-08-25 PROCEDURE — 0502F SUBSEQUENT PRENATAL CARE: CPT | Performed by: STUDENT IN AN ORGANIZED HEALTH CARE EDUCATION/TRAINING PROGRAM

## 2022-08-25 NOTE — PROGRESS NOTES
Prenatal Visit    Jamel Rosales is a 25 y.o. female  at 36w4d    The patient was seen and evaluated. Reports positive fetal movements. She denies headache, vision changes, RUQ pain, contractions, vaginal bleeding and leakage of fluid. The patient was instructed on fetal kick counts and was given a kick sheet to complete every 8 hours. She was instructed that the baby should move at a minimum of ten times within one hour after a meal. The patient was instructed to lay down on her left side twenty minutes after eating and count movements for up to one hour with a target value of ten movements. She was instructed to notify the office if she did not make that target after two attempts or if after any attempt there was less than four movements. The patient reports that the targets have been made. The patient already received the T-Dap Vaccine (27-36 weeks) this pregnancy. The patient declined the influenza vaccine this year. The problem list reflects the active issues addressed during today's visit. Allergies:  Patient has no known allergies. Vitals:    BP: 114/74  Weight: 157 lb (71.2 kg)  Fundal Height (cm): 38 cm  Fetal HR: 129  Movement: Present  Dilation (cm): 1  Effacement: 50  Station: -3     Physical Exam:  SVE: 1-2 cm dilated, 50 effaced, -3 station, cervical position posterior    Labs:  Group Beta Strep collection was done. Sensitivities for clindamycin and erythromycin were not ordered. The patient was found to be GBS: negative    Assessment & Plan:  Jamel Rosales is a 25 y.o. female  at 36w4d   - Declines RR IOL   - The ACIP recommended pregnant patients be included in phase 1C of vaccine distribution. This decision is supported by [facility] and ACOG. As of 2021, there have been over 30,000 pregnant patients included in the V-safe post COVID vaccination safety .  Most (73%) reports to [facility] among pregnant women involved non-pregnancyspecific adverse events (e.g., local and systemic reactions). Miscarriage was the most frequently reported pregnancy-specific adverse event to [facility]; numbers are within the known background rates based on presumed COVID-19 vaccine doses administered to pregnant women. No unexpected pregnancy or infant outcomes have been observed related to  COVID-19 vaccination during pregnancy. Recommended patient proceed with vaccination. Patient Active Problem List    Diagnosis Date Noted    PUPP (pruritic urticarial papules and plaques of pregnancy) 08/03/2022     Priority: Medium    Supervision of normal pregnancy 08/02/2022     Priority: Medium    Echogenic focus of heart of fetus affecting antepartum care of mother 05/06/2022     Priority: Medium     Declining NIPT      Transverse fetal lie (RSLVD) 05/04/2022     Priority: Medium     [facility] 9/9823: Cephalic       Return in about 1 week (around 9/1/2022) for DO Neal Narvaez Ob/Gyn   8/25/2022, 11:22 AM

## 2022-08-30 ENCOUNTER — ANESTHESIA (OUTPATIENT)
Dept: LABOR AND DELIVERY | Age: 22
End: 2022-08-30
Payer: COMMERCIAL

## 2022-08-30 ENCOUNTER — HOSPITAL ENCOUNTER (INPATIENT)
Age: 22
LOS: 3 days | Discharge: HOME | End: 2022-09-02
Attending: STUDENT IN AN ORGANIZED HEALTH CARE EDUCATION/TRAINING PROGRAM | Admitting: OBSTETRICS & GYNECOLOGY
Payer: COMMERCIAL

## 2022-08-30 ENCOUNTER — ANESTHESIA EVENT (OUTPATIENT)
Dept: LABOR AND DELIVERY | Age: 22
End: 2022-08-30
Payer: COMMERCIAL

## 2022-08-30 DIAGNOSIS — G89.18 POST-OP PAIN: Primary | ICD-10-CM

## 2022-08-30 PROBLEM — Z3A.39 39 WEEKS GESTATION OF PREGNANCY: Status: ACTIVE | Noted: 2022-08-30

## 2022-08-30 PROBLEM — J45.909 ASTHMA: Status: ACTIVE | Noted: 2022-08-30

## 2022-08-30 PROBLEM — Z83.3 FAMILY HISTORY OF DIABETES DURING PREGNANCY: Status: ACTIVE | Noted: 2022-08-30

## 2022-08-30 LAB
ABO/RH: NORMAL
ABSOLUTE EOS #: 0.15 K/UL (ref 0–0.44)
ABSOLUTE IMMATURE GRANULOCYTE: 0.11 K/UL (ref 0–0.3)
ABSOLUTE LYMPH #: 2.18 K/UL (ref 1.1–3.7)
ABSOLUTE MONO #: 0.98 K/UL (ref 0.1–1.2)
AMPHETAMINE SCREEN URINE: NEGATIVE
ANTIBODY SCREEN: NEGATIVE
ARM BAND NUMBER: NORMAL
BARBITURATE SCREEN URINE: NEGATIVE
BASOPHILS # BLD: 0 % (ref 0–2)
BASOPHILS ABSOLUTE: 0.05 K/UL (ref 0–0.2)
BENZODIAZEPINE SCREEN, URINE: NEGATIVE
CANNABINOID SCREEN URINE: NEGATIVE
COCAINE METABOLITE, URINE: NEGATIVE
EOSINOPHILS RELATIVE PERCENT: 1 % (ref 1–4)
EXPIRATION DATE: NORMAL
FENTANYL URINE: NEGATIVE
HCT VFR BLD CALC: 30.9 % (ref 36.3–47.1)
HEMOGLOBIN: 9.5 G/DL (ref 11.9–15.1)
IMMATURE GRANULOCYTES: 1 %
LYMPHOCYTES # BLD: 16 % (ref 24–43)
MCH RBC QN AUTO: 23.6 PG (ref 25.2–33.5)
MCHC RBC AUTO-ENTMCNC: 30.7 G/DL (ref 28.4–34.8)
MCV RBC AUTO: 76.9 FL (ref 82.6–102.9)
METHADONE SCREEN, URINE: NEGATIVE
MONOCYTES # BLD: 7 % (ref 3–12)
NRBC AUTOMATED: 0 PER 100 WBC
OPIATES, URINE: NEGATIVE
OXYCODONE SCREEN URINE: NEGATIVE
PDW BLD-RTO: 16.2 % (ref 11.8–14.4)
PHENCYCLIDINE, URINE: NEGATIVE
PLATELET # BLD: 331 K/UL (ref 138–453)
PMV BLD AUTO: 11 FL (ref 8.1–13.5)
RBC # BLD: 4.02 M/UL (ref 3.95–5.11)
RBC # BLD: ABNORMAL 10*6/UL
SEG NEUTROPHILS: 75 % (ref 36–65)
SEGMENTED NEUTROPHILS ABSOLUTE COUNT: 10.1 K/UL (ref 1.5–8.1)
T. PALLIDUM, IGG: NONREACTIVE
TEST INFORMATION: NORMAL
WBC # BLD: 13.6 K/UL (ref 3.5–11.3)

## 2022-08-30 PROCEDURE — 86780 TREPONEMA PALLIDUM: CPT

## 2022-08-30 PROCEDURE — 2500000003 HC RX 250 WO HCPCS: Performed by: NURSE ANESTHETIST, CERTIFIED REGISTERED

## 2022-08-30 PROCEDURE — 86850 RBC ANTIBODY SCREEN: CPT

## 2022-08-30 PROCEDURE — 1220000000 HC SEMI PRIVATE OB R&B

## 2022-08-30 PROCEDURE — 3700000025 EPIDURAL BLOCK: Performed by: ANESTHESIOLOGY

## 2022-08-30 PROCEDURE — 96372 THER/PROPH/DIAG INJ SC/IM: CPT

## 2022-08-30 PROCEDURE — 6360000002 HC RX W HCPCS

## 2022-08-30 PROCEDURE — 2580000003 HC RX 258

## 2022-08-30 PROCEDURE — 85025 COMPLETE CBC W/AUTO DIFF WBC: CPT

## 2022-08-30 PROCEDURE — 86901 BLOOD TYPING SEROLOGIC RH(D): CPT

## 2022-08-30 PROCEDURE — 80307 DRUG TEST PRSMV CHEM ANLYZR: CPT

## 2022-08-30 PROCEDURE — 86900 BLOOD TYPING SEROLOGIC ABO: CPT

## 2022-08-30 PROCEDURE — 6360000002 HC RX W HCPCS: Performed by: ANESTHESIOLOGY

## 2022-08-30 RX ORDER — ACETAMINOPHEN 500 MG
1000 TABLET ORAL EVERY 6 HOURS PRN
Status: DISCONTINUED | OUTPATIENT
Start: 2022-08-30 | End: 2022-08-31

## 2022-08-30 RX ORDER — SODIUM CHLORIDE, SODIUM LACTATE, POTASSIUM CHLORIDE, AND CALCIUM CHLORIDE .6; .31; .03; .02 G/100ML; G/100ML; G/100ML; G/100ML
500 INJECTION, SOLUTION INTRAVENOUS PRN
Status: DISCONTINUED | OUTPATIENT
Start: 2022-08-30 | End: 2022-08-31

## 2022-08-30 RX ORDER — LIDOCAINE HYDROCHLORIDE AND EPINEPHRINE 15; 5 MG/ML; UG/ML
INJECTION, SOLUTION EPIDURAL PRN
Status: DISCONTINUED | OUTPATIENT
Start: 2022-08-30 | End: 2022-08-31 | Stop reason: SDUPTHER

## 2022-08-30 RX ORDER — SODIUM CHLORIDE, SODIUM LACTATE, POTASSIUM CHLORIDE, CALCIUM CHLORIDE 600; 310; 30; 20 MG/100ML; MG/100ML; MG/100ML; MG/100ML
INJECTION, SOLUTION INTRAVENOUS CONTINUOUS
Status: DISCONTINUED | OUTPATIENT
Start: 2022-08-30 | End: 2022-08-31

## 2022-08-30 RX ORDER — MORPHINE SULFATE 10 MG/ML
10 INJECTION, SOLUTION INTRAMUSCULAR; INTRAVENOUS ONCE
Status: COMPLETED | OUTPATIENT
Start: 2022-08-30 | End: 2022-08-30

## 2022-08-30 RX ORDER — CARBOPROST TROMETHAMINE 250 UG/ML
250 INJECTION, SOLUTION INTRAMUSCULAR PRN
Status: DISCONTINUED | OUTPATIENT
Start: 2022-08-30 | End: 2022-08-31

## 2022-08-30 RX ORDER — SODIUM CHLORIDE 0.9 % (FLUSH) 0.9 %
5-40 SYRINGE (ML) INJECTION PRN
Status: DISCONTINUED | OUTPATIENT
Start: 2022-08-30 | End: 2022-08-31

## 2022-08-30 RX ORDER — SODIUM CHLORIDE 9 MG/ML
25 INJECTION, SOLUTION INTRAVENOUS PRN
Status: DISCONTINUED | OUTPATIENT
Start: 2022-08-30 | End: 2022-08-31

## 2022-08-30 RX ORDER — SODIUM CHLORIDE 0.9 % (FLUSH) 0.9 %
5-40 SYRINGE (ML) INJECTION EVERY 12 HOURS SCHEDULED
Status: DISCONTINUED | OUTPATIENT
Start: 2022-08-30 | End: 2022-08-31

## 2022-08-30 RX ORDER — ONDANSETRON 2 MG/ML
4 INJECTION INTRAMUSCULAR; INTRAVENOUS EVERY 6 HOURS PRN
Status: DISCONTINUED | OUTPATIENT
Start: 2022-08-30 | End: 2022-08-31

## 2022-08-30 RX ORDER — PHYTONADIONE 1 MG/.5ML
1 INJECTION, EMULSION INTRAMUSCULAR; INTRAVENOUS; SUBCUTANEOUS ONCE
Status: DISCONTINUED | OUTPATIENT
Start: 2022-08-30 | End: 2022-08-30

## 2022-08-30 RX ORDER — DIPHENHYDRAMINE HCL 25 MG
25 TABLET ORAL EVERY 4 HOURS PRN
Status: DISCONTINUED | OUTPATIENT
Start: 2022-08-30 | End: 2022-08-31

## 2022-08-30 RX ORDER — SODIUM CHLORIDE, SODIUM LACTATE, POTASSIUM CHLORIDE, AND CALCIUM CHLORIDE .6; .31; .03; .02 G/100ML; G/100ML; G/100ML; G/100ML
1000 INJECTION, SOLUTION INTRAVENOUS PRN
Status: DISCONTINUED | OUTPATIENT
Start: 2022-08-30 | End: 2022-08-31

## 2022-08-30 RX ORDER — ERYTHROMYCIN 5 MG/G
OINTMENT OPHTHALMIC ONCE
Status: DISCONTINUED | OUTPATIENT
Start: 2022-08-30 | End: 2022-08-30

## 2022-08-30 RX ORDER — TRANEXAMIC ACID 10 MG/ML
1000 INJECTION, SOLUTION INTRAVENOUS
Status: ACTIVE | OUTPATIENT
Start: 2022-08-30 | End: 2022-08-30

## 2022-08-30 RX ORDER — METHYLERGONOVINE MALEATE 0.2 MG/ML
200 INJECTION INTRAVENOUS PRN
Status: DISCONTINUED | OUTPATIENT
Start: 2022-08-30 | End: 2022-08-31

## 2022-08-30 RX ORDER — MISOPROSTOL 100 UG/1
800 TABLET ORAL PRN
Status: DISCONTINUED | OUTPATIENT
Start: 2022-08-30 | End: 2022-08-31

## 2022-08-30 RX ORDER — PROCHLORPERAZINE EDISYLATE 5 MG/ML
10 INJECTION INTRAMUSCULAR; INTRAVENOUS ONCE
Status: COMPLETED | OUTPATIENT
Start: 2022-08-30 | End: 2022-08-30

## 2022-08-30 RX ADMIN — PROCHLORPERAZINE EDISYLATE 10 MG: 5 INJECTION INTRAMUSCULAR; INTRAVENOUS at 14:09

## 2022-08-30 RX ADMIN — SODIUM CHLORIDE, SODIUM LACTATE, POTASSIUM CHLORIDE, AND CALCIUM CHLORIDE 1000 ML: .6; .31; .03; .02 INJECTION, SOLUTION INTRAVENOUS at 16:32

## 2022-08-30 RX ADMIN — MORPHINE SULFATE 10 MG: 10 INJECTION INTRAVENOUS at 14:10

## 2022-08-30 RX ADMIN — ROPIVACAINE HYDROCHLORIDE 7 ML: 2 INJECTION, SOLUTION EPIDURAL; INFILTRATION at 17:49

## 2022-08-30 RX ADMIN — SODIUM CHLORIDE, SODIUM LACTATE, POTASSIUM CHLORIDE, AND CALCIUM CHLORIDE 500 ML: .6; .31; .03; .02 INJECTION, SOLUTION INTRAVENOUS at 14:34

## 2022-08-30 RX ADMIN — SODIUM CHLORIDE, POTASSIUM CHLORIDE, SODIUM LACTATE AND CALCIUM CHLORIDE: 600; 310; 30; 20 INJECTION, SOLUTION INTRAVENOUS at 09:06

## 2022-08-30 RX ADMIN — Medication 1 MILLI-UNITS/MIN: at 04:32

## 2022-08-30 RX ADMIN — SODIUM CHLORIDE, SODIUM LACTATE, POTASSIUM CHLORIDE, AND CALCIUM CHLORIDE 500 ML: .6; .31; .03; .02 INJECTION, SOLUTION INTRAVENOUS at 15:59

## 2022-08-30 RX ADMIN — SODIUM CHLORIDE, POTASSIUM CHLORIDE, SODIUM LACTATE AND CALCIUM CHLORIDE: 600; 310; 30; 20 INJECTION, SOLUTION INTRAVENOUS at 02:16

## 2022-08-30 RX ADMIN — ROPIVACAINE HYDROCHLORIDE 10 ML/HR: 2 INJECTION, SOLUTION EPIDURAL; INFILTRATION at 17:50

## 2022-08-30 RX ADMIN — SODIUM CHLORIDE, POTASSIUM CHLORIDE, SODIUM LACTATE AND CALCIUM CHLORIDE: 600; 310; 30; 20 INJECTION, SOLUTION INTRAVENOUS at 15:32

## 2022-08-30 RX ADMIN — LIDOCAINE HYDROCHLORIDE,EPINEPHRINE BITARTRATE 2 ML: 15; .005 INJECTION, SOLUTION EPIDURAL; INFILTRATION; INTRACAUDAL; PERINEURAL at 17:45

## 2022-08-30 RX ADMIN — LIDOCAINE HYDROCHLORIDE,EPINEPHRINE BITARTRATE 3 ML: 15; .005 INJECTION, SOLUTION EPIDURAL; INFILTRATION; INTRACAUDAL; PERINEURAL at 17:42

## 2022-08-30 ASSESSMENT — ENCOUNTER SYMPTOMS: SHORTNESS OF BREATH: 0

## 2022-08-30 ASSESSMENT — LIFESTYLE VARIABLES: SMOKING_STATUS: 0

## 2022-08-30 NOTE — PROGRESS NOTES
Labor Progress Note    Aye Rivera is a 25 y.o. female  at 39w0d  The patient was seen and examined. SVE performed at bedside following epidural placement. Patient tolerated procedures. Her pain is well controlled. She reports fetal movement is present, complains of contractions, complains of loss of fluid, denies vaginal bleeding.        Vital Signs:  Vitals:    22 1805 22 1806 22 1816 22 1830   BP:  121/66 122/64 115/62   Pulse:  72 72 73   Resp:  16 16 16   Temp:       TempSrc:       SpO2: 98%      Weight:       Height:             FHT: 120, moderate variability, accelerations present, decelerations absent  Contractions: regular, every 2-4 minutes    Chaperone for Intimate Exam: Chaperone was present for entire exam, Chaperone Name: Hannah  Cervical Exam: 5 cm dilated, 50 effaced, -1 station  Pitocin: @ 20 mu/min    Membranes: Ruptured clear fluid  Scalp Electrode in place: absent  Intrauterine Pressure Catheter in Place: absent    Interventions: SVE     Assessment/Plan:  Aye Rivera is a 25 y.o. female  at 39w0d admitted for Spontaneous Labor    - GBS negative, No indication for GBS prophylaxis   - VSS    - cEFM/TOCO: Category 1 FHT, regular contractions    - Epidural in place    - S/p AROM (clear)    - Pitocin break at this time; restart pitocin at    - Continue current care         Attending updated and in agreement with plan    Janice Emmanuel MD  Ob/Gyn Resident  2022, 6:33 PM

## 2022-08-30 NOTE — PROGRESS NOTES
Labor Progress Note    Charan Basurto is a 25 y.o. female  at 39w0d  The patient was seen and examined. Her pain is well controlled. She reports fetal movement is present, complains of contractions, denies loss of fluid, denies vaginal bleeding. Patient declining AROM at this time. Vital Signs:  Vitals:    22 1100 22 1201 22 1202 22 1303   BP: 124/72  131/76 131/70   Pulse: 88  81 (!) 110   Resp: 18 18  18   Temp:  97.9 °F (36.6 °C)     TempSrc:  Oral     SpO2:       Weight:       Height:             FHT:  145 , moderate variability, accelerations present, decelerations absent  Contractions: regular, every 3-4 minutes    Chaperone for Intimate Exam: Chaperone was present for entire exam, Chaperone Name: Nik Rodriguez RN  Cervical Exam: 5 cm dilated, 50 effaced, -2 station  Pitocin: @ 16 mu/min    Membranes: Intact  Scalp Electrode in place: absent  Intrauterine Pressure Catheter in Place: absent    Interventions: SVE    Assessment/Plan:  Charan Basurto is a 25 y.o. female  at 39w0d admitted for Spontaneous Labor   - GBS negative, No indication for GBS prophylaxis   - VSS, afebrile   - cEFM/TOCO- Cat 1   - Continue pitocin per protocol   - Discussed AROM, patient continues to decline at this time   - Continue to monitor      Attending and Senior Resident updated and in agreement with plan.     Rayray Lopez MD  Ob/Gyn Resident PGY1  2022, 1:29 PM

## 2022-08-30 NOTE — PROGRESS NOTES
Labor Progress Note    Lois Kapoor is a 25 y.o. female  at 39w0d  The patient was seen and examined. Her pain is well controlled with morphine/compazine. She reports fetal movement is present, complains of contractions, denies loss of fluid, denies vaginal bleeding. AROM performed. Clear Fluid. Patient tolerated well. Vital Signs:  Vitals:    22 1201 22 1202 22 1303 22 1401   BP:  131/76 131/70 133/70   Pulse:  81 (!) 110 84   Resp: 18  18 18   Temp: 97.9 °F (36.6 °C)      TempSrc: Oral      SpO2:       Weight:       Height:            FHT:  135 , moderate variability, accelerations present, few variable decelerations with spontaneous return to baseline. Overall reassuring  Contractions: regular, every 2-5 minutes    Chaperone for Intimate Exam: Chaperone was present for entire exam, Chaperone Name: Corene Percady RN  Cervical Exam: 5 cm dilated, 50 effaced, -2 station  Pitocin: @ 18 mu/min    Membranes: Ruptured clear fluid  Scalp Electrode in place: absent  Intrauterine Pressure Catheter in Place: absent    Interventions: SVE and AROM    Assessment/Plan:  Lois Kapoor is a 25 y.o. female  at 39w0d admitted for Spontaneous Labor              - GBS negative, No indication for GBS prophylaxis              - VSS, afebrile              - cEFM/TOCO- Cat 1              - Continue pitocin per protocol              - AROM performed. Patient tolerated well. Clear fluid. - Continue to monitor    Attending and Senior Resident updated and in agreement with plan.     Cierra Maurer MD  Ob/Gyn Resident PGY1  2022, 2:42 PM

## 2022-08-30 NOTE — H&P
[facility]    Date: [facility]       Time: 12:54 AM   Patient Name: Shelly Kramer     Patient : 2000  Room/Bed: [place] [address]    Admission Date/Time: 2022 12:49 AM      CC: Contractions     HPI: Shelly Kramer is a 25 y.o.  at 39w0d who presents with contractions. The patient reports contractions since 8pm which are increasing and strength and frequency. The patient reports fetal movement is present, denies loss of fluid, denies vaginal bleeding. Patient denies headache, vision changes, nausea, vomiting, fever, chills, shortness of breath, chest pain, RUQ pain, abdominal pain, diarrhea, change in color/amount/odor of vaginal discharge, dysuria or, hematuria. DATING:  LMP: Patient's last menstrual period was 2021 (exact date).   Estimated Date of Delivery: 22   Based on: LMP consistent with U/S at 11 4/7 weeks GA    PREGNANCY RISK FACTORS:  Patient Active Problem List   Diagnosis    Transverse fetal lie (RSLVD)    Echogenic focus of heart of fetus affecting antepartum care of mother    Supervision of normal pregnancy    PUPP (pruritic urticarial papules and plaques of pregnancy)        Steroids Given In This Pregnancy:  no     REVIEW OF SYSTEMS:   Constitutional: negative fever, negative chills, negative weight changes   HEENT: negative visual disturbances, negative headaches, negative dizziness, negative hearing loss  Breast: Negative breast abnormalities, negative breast lumps, negative nipple discharge  Respiratory: negative dyspnea, negative cough, negative SOB  Cardiovascular: negative chest pain,  negative palpitations, negative arrhythmia, negative syncope   Gastrointestinal: negative abdominal pain, positive contractions, negative RUQ pain, negative N/V, negative diarrhea, negative constipation, negative bowel changes, negative heartburn   Genitourinary: negative dysuria, negative hematuria, negative urinary incontinence, negative vaginal discharge, negative vaginal bleeding or spotting  Dermatological: negative rash, negative pruritis, negative mole or other skin changes  Hematologic: negative bruising  Immunologic/Lymphatic: negative recent illness, negative recent sick contact  Musculoskeletal: negative back pain, negative myalgias, negative arthralgias  Neurological:  negative dizziness, negative migraines, negative seizures, negative weakness  Behavior/Psych: negative depression, negative anxiety, negative SI, negative HI    OBSTETRIC HISTORY:   OB History    Para Term  AB Living   1 0 0 0 0 0   SAB IAB Ectopic Molar Multiple Live Births   0 0 0 0 0 0      # Outcome Date GA Lbr Alfonso/2nd Weight Sex Delivery Anes PTL Lv   1 Current                PAST MEDICAL HISTORY:   has a past medical history of Asthma, Neurologic disorder, and Stress. PAST SURGICAL HISTORY:   has a past surgical history that includes Tonsillectomy; Adenoidectomy; and Matthews tooth extraction (2018). ALLERGIES:  has No Known Allergies. MEDICATIONS:  Prior to Admission medications    Medication Sig Start Date End Date Taking? Authorizing Provider   Prenatal Vit-Fe Fumarate-FA (PRENATAL VITAMIN PO) Take 1 tablet by mouth daily    Historical Provider, MD   albuterol sulfate  (90 Base) MCG/ACT inhaler Inhale 2 puffs into the lungs every 6 hours as needed for Wheezing    Historical Provider, MD       FAMILY HISTORY:  family history includes Breast Cancer in her paternal grandmother; Diabetes in her paternal grandfather and paternal grandmother; Diabetes type 2  in her father and maternal grandmother; Heart Attack in her paternal grandfather; High Blood Pressure in her father; Migraines in her maternal grandmother and mother; No Known Problems in her brother and maternal grandfather; Thyroid Disease in her maternal grandmother. SOCIAL HISTORY:   reports that she has never smoked.  She has never used smokeless tobacco. She reports that she does not drink alcohol and does not use drugs.     VITALS:  Vitals:    08/30/22 0059 08/30/22 0107   BP: 132/74 132/74   Pulse: 92 93   Resp: 18 18   Temp: 98 °F (36.7 °C) 98 °F (36.7 °C)   TempSrc: Oral Oral   SpO2:  100%   Weight: 157 lb (71.2 kg)    Height: 5' 3\" (1.6 m)        PHYSICAL EXAM:  Fetal Heart Monitor:  Baseline Heart Rate 130, moderate variability, present accelerations, absent decelerations  [place]: contractions, regular, every 4 minutes    General appearance:  no apparent distress, alert and cooperative  HEENT: head atraumatic, normocephalic, moist mucous membranes, trachea midline  Neurologic:  alert, oriented, normal speech, no focal findings or movement disorder noted  Lungs:  No increased work of breathing, good air exchange, clear to auscultation bilaterally, no crackles or wheezing  Heart:  regular rate and rhythm and no murmur, rubs, gallops  Abdomen:  soft, gravid, non-tender, no rebound, guarding, or rigidity, no RUQ or epigastric tenderness, no signs or symptoms of abruption, no signs or symptoms of chorioamnionitis  Extremities:  no calf tenderness, non edematous  Musculoskeletal: Gross strength equal and intact throughout, no gross abnormalities  Psychiatric: Mood appropriate, normal affect   Rectal Exam: not indicated  Pelvic Exam:   Chaperone for Intimate Exam: Chaperone was present for entire exam, Chaperone Name: CARLIN Carranza   Sterile Vaginal Exam:  Cervix: No cervical motion tenderness  Cervix: 3 cm dilated, 50 % effaced, -2 station, mid position (out of 3 station), medium consistency, FETAL POSITION: Cephalic, Membranes intact,    Bishops Score: 6     0 1 2 3   Position Posterior Intermediate Anterior -   Consistency Firm Intermediate Soft -   Effacement 0-30% 31-50% 51-80% >80%   Dilation 0cm 1-2cm 3-4cm >5cm   Fetal Station -3 -2 -1, 0 +1, +2     LIMITED BEDSIDE US:  Position: Cephalic  Placental Location: anterior  Fetal Heart Tones: Present  Fetal Movement: Present   Estimated Fetal Weight:  8 lbs 1 oz    PRENATAL LAB RESULTS:   Blood Type/Rh: A pos  Antibody Screen: negative  Hemoglobin, Hematocrit, Platelets: 32.4/50.8/638  Rubella: immune  T.  Pallidum, IgG: non-reactive  Hepatitis B Surface Antigen: non-reactive   Hepatitis C Antibody: non-reactive   HIV: non-reactive   Gonorrhea: negative  Chlamydia: negative  Urine culture: negative, date: 22    Early 1 hour Glucose Tolerance Test:  102    1 hour Glucose Tolerance Test:  106    Group B Strep: negative on 8/3/22  Cystic Fibrosis Screen: negative  Sickle Cell Screen: not done  First Trimester Screen: not done  QUAD: not done  MSAFP: not done   Non-Invasive Prenatal Testing: not done  Anatomy US: anterior placenta, 3 VC, female gender, fetal echogenic focus, otherwise normal anatomy    ASSESSMENT & PLAN:  Enedelia Barkley is a 25 y.o. female  at 39w0d    - GBS negative / Rh positive / R immune   - No indication for GBS prophylaxis     Contractions  - Patient complaining of contractions  - VSS    - cEFM/TOCO - Cat 1 tracing, contractions q4 minutes    - SVE: 3/50/-2, recent SVE at [facility] 9038 on 22 of 50/-2   - ATSO Dr. Armida Vaca    - CBC, T&S, Tpal, UDS    - 125ml/hr LR IV    - Monitor patient for labor progress and augment if necessary    - Continue to monitor closely     Asthma   - Clinically asymptomatic at this time    - Pt uses albuterol in hailer PRN for symptom control     PUPP (pruritic urticarial papules and plaques of pregnancy)   - Clinically asymptomatic at this time     FHx DM    - DM in patient's father    - GTT wnl in pregnancy     Fetal Echogenic Foci   - Found on MFM Anatomy Scan    - Patient declined NIPT     BMI 27    Patient Active Problem List    Diagnosis Date Noted    PUPP (pruritic urticarial papules and plaques of pregnancy) 2022     Priority: Medium    Supervision of normal pregnancy 2022     Priority: Medium    Echogenic focus of heart of fetus affecting antepartum care of

## 2022-08-30 NOTE — FLOWSHEET NOTE
Dr Marco Antonio Banks bedside to AROM all discussed with patient, 500 LR bolus per pump. Dr Alvarez bedside to help AROM.

## 2022-08-30 NOTE — CARE COORDINATION
ANTEPARTUM NOTE    39 weeks gestation of pregnancy [Z3A.39]    Lana Batres was admitted to L&D on 8/30/2022 for contractions @ 39w0d    OB GYN Provider: Dr. Jj Calixto    Will meet with patient after delivery to verify name/address/phone/insurance and discuss discharge planning. Anticipate DC home 2 nights after vaginal delivery or 4 nights after C/S delivery as long as hemodynamically stable.

## 2022-08-30 NOTE — PROGRESS NOTES
Labor Progress Note    Aye Rivera is a 25 y.o. female  at 39w0d  The patient was seen and examined. Her pain is well controlled. She reports fetal movement is present, complains of contractions, denies loss of fluid, denies vaginal bleeding. Spoke with patient regarding the next step of AROM. She would like to hold off for now until she becomes more uncomfortable.      Vital Signs:  Vitals:    22 0619 22 0733 22 0828 22 0905   BP: 130/80 (!) 112/59 (!) 112/51 120/62   Pulse: 74 (!) 113 77 86   Resp:  16 16 16   Temp:  98.4 °F (36.9 °C)     TempSrc:  Oral     SpO2:  100%     Weight:       Height:             FHT:  135 , moderate variability, accelerations present, decelerations absent  Contractions: regular, every 3 minutes    Chaperone for Intimate Exam: Chaperone was present for entire exam, Chaperone Name: Moris Pinon RN  Cervical Exam: declined  Pitocin: @ 10 mu/min    Membranes: Intact  Scalp Electrode in place: absent  Intrauterine Pressure Catheter in Place: absent    Interventions: none    Assessment/Plan:  Aye Rivera is a 25 y.o. female  at 39w0d admitted for IUP   - GBS negative, No indication for GBS prophylaxis   - VSS, afebrile    - cEFM/TOCO- Cat 1, regular contractions   - IVF LR @ 125 ml/hr     Spontaneous Labor   - Pitocin at 12   - Continue to monitor    Attending updated and in agreement with plan    Gaila Snellen, DO  Ob/Gyn Resident  2022, 9:42 AM [Follow-up Visit ___] : a follow-up visit  for [unfilled]

## 2022-08-30 NOTE — ANESTHESIA PROCEDURE NOTES
Epidural Block    Patient location during procedure: OB  Reason for block: labor epidural  Staffing  Performed: resident/CRNA   Resident/CRNA: Chirag Bob APRN - CRNA  Epidural  Patient position: sitting  Prep: Betadine  Patient monitoring: continuous pulse ox and frequent blood pressure checks  Approach: midline  Location: L3-4  Injection technique: MINERVA saline  Guidance: paresthesia technique  Provider prep: mask and sterile gloves  Needle  Needle type: Tuohy   Needle gauge: 17 G  Needle length: 3.5 in  Needle insertion depth: 7 cm  Catheter type: side hole  Catheter size: 19 G  Catheter at skin depth: 12 cm  Test dose: negativeCatheter Secured: tegaderm and tape  Assessment  Sensory level: T10  Hemodynamics: stable  Attempts: 1  Outcomes: uncomplicated and patient tolerated procedure well  Preanesthetic Checklist  Completed: patient identified, IV checked, site marked, risks and benefits discussed, surgical/procedural consents, equipment checked, pre-op evaluation, timeout performed, anesthesia consent given, oxygen available, monitors applied/VS acknowledged, fire risk safety assessment completed and verbalized and blood product R/B/A discussed and consented

## 2022-08-30 NOTE — FLOWSHEET NOTE
Dr Catarino Oconnor on floor fetal tracing and contractions reviewed, orders may give another 500 LR bolus.

## 2022-08-30 NOTE — PROGRESS NOTES
Labor Progress Note    Nolan Mattson is a 25 y.o. female  at 39w0d  The patient was seen and examined. SVE performed at bedside and tolerated well. Her pain is well controlled. She reports fetal movement is present, complains of contractions, denies loss of fluid, denies vaginal bleeding.        Vital Signs:  Vitals:    22 0059 22 0107   BP: 132/74 132/74   Pulse: 92 93   Resp: 18 18   Temp: 98 °F (36.7 °C) 98 °F (36.7 °C)   TempSrc: Oral Oral   SpO2:  100%   Weight: 157 lb (71.2 kg)    Height: 5' 3\" (1.6 m)        FHT: 120, moderate variability, accelerations present, decelerations absent  Contractions: regular, every 2-3 minutes    Chaperone for Intimate Exam: Chaperone was present for entire exam, Chaperone Name: Janet Justin RN   Cervical Exam: 4 cm dilated, 50 effaced, -2 station  Pitocin: @ 2 mu/min    Membranes: Intact  Scalp Electrode in place: absent  Intrauterine Pressure Catheter in Place: absent    Interventions: SVE    Assessment/Plan:  Nolan Mattson is a 25 y.o. female  at 39w0d admitted for Spontaneous Labor    - GBS negative, No indication for GBS prophylaxis   - VSS    - cEFM/TOCO: Category 1 FHT, regular contractions    - Pitocin per protocol    - Continue to monitor closely       Attending updated and in agreement with plan    Russell Prajapati MD  Ob/Gyn Resident  2022, 5:59 AM

## 2022-08-30 NOTE — DISCHARGE SUMMARY
Obstetric Discharge Summary  [facility]    Patient Name: Jovany Morton  Patient : 2000  Primary Care Physician: No primary care provider on file. Admit Date: 2022    Principal Diagnosis: IUP at 39w0d, admitted for spontaneous labor      Her pregnancy has been complicated by:   Patient Active Problem List   Diagnosis    Transverse fetal lie (RSLVD)    Echogenic focus of heart of fetus affecting antepartum care of mother    Supervision of normal pregnancy    PUPP (pruritic urticarial papules and plaques of pregnancy)    Rh+/RI/GBSneg    Asthma    Family history of diabetes during pregnancy    PLTCS 22 F Apg 8/9 Wt 7#13    Post-op pain    Post-op Anemia        Infection Present?: No  Hospital Acquired: No    Surgical Operations & Procedures:  Analgesia: epidural  Delivery Type:  Delivery: See Labor and Delivery Summary   Laceration(s): Absent    Consultations: NICU and Anesthesia    Pertinent Findings & Procedures:   Jovany Morton is a 25 y.o. female  at 39w0d admitted for spontaneous labor; received pitocin, epidural, morphine/compazine x1, AROM (clr), IUPC/Amnio. Late decelerations began occurring at 0300 on 22 and continued despite resuscitation measures. Patient also had 2 prolonged decelerations in this time period. Due to consistent Category 2 FHT remote from delivery, the risks and benefits of  section versus vaginal delivery were discussed with the patient. Patient opted to proceed with  section 2/2 category 2 FHT remote from delivery. She delivered by primary low transverse  a Live Born infant on 22. Delivery complicated by intra-op hemorrhage of 1025ml. Information for the patient's :  Bisi Hilliard [[id number]]   female   Birth Weight: 7 lb 13.4 oz (3.555 kg)     Apgars: 8 at 1 minute and 9 at 5 minutes. Postpartum course: normal.  Patient met criteria for gHTN post operatively.  Pre E labs were wnl, P/C 0.25. POD#1: Hgb 7.2, patient received IV venofer x1    Course of patient: uncomplicated    Discharge to: Home    Readmission planned: no     Recommendations on Discharge:     Medications:      Medication List        START taking these medications      docusate sodium 100 MG capsule  Commonly known as: Colace  Take 1 capsule by mouth 2 times daily as needed for Constipation     ferrous sulfate 325 (65 Fe) MG tablet  Commonly known as: IRON 325  Take 1 tablet by mouth every other day     ibuprofen 600 MG tablet  Commonly known as: ADVIL;MOTRIN  Take 1 tablet by mouth every 6 hours as needed for Pain     oxyCODONE 5 MG immediate release tablet  Commonly known as: Roxicodone  Take 1 tablet by mouth every 6 hours as needed for Pain for up to 5 days. Intended supply: 5 days. Take lowest dose possible to manage pain            CONTINUE taking these medications      albuterol sulfate  (90 Base) MCG/ACT inhaler  Commonly known as: PROVENTIL;VENTOLIN;PROAIR     PRENATAL VITAMIN PO               Where to Get Your Medications        These medications were sent to [facility] [address] [facility], [address] [facility]  [address] [facility], [address] [address]      Phone: [phone]   docusate sodium 100 MG capsule  ferrous sulfate 325 (65 Fe) MG tablet  ibuprofen 600 MG tablet  oxyCODONE 5 MG immediate release tablet           Activity: pelvic rest x 6 weeks, no driving on narcotics, no lifting greater than 15 lbs  Diet: regular diet  Follow up: 1 week for BP check    Condition on discharge: stable    Discharge date: 9/2/22    Dorina Pete DO  Ob/Gyn Resident    Comments:  Home care and follow-up care were reviewed. Pelvic rest, and birth control were reviewed. Signs and symptoms of mastitis and post partum depression were reviewed. The patient is to notify her physician if any of these occur.  The patient was counseled on secondary smoke risks and the increased risk of sudden infant death syndrome and respiratory problems to her baby with exposure. She was counseled on various alternate recommendations to decrease the exposure to secondary smoke to her children.

## 2022-08-30 NOTE — FLOWSHEET NOTE
Patient standing by side of bed waiting for epidural patient breathing well with contractions and rocking hips back and forth. FOB very supportive.

## 2022-08-30 NOTE — ANESTHESIA PRE PROCEDURE
Department of Anesthesiology  Preprocedure Note       Name:  Shelly Kramer   Age:  25 y.o.  :  2000                                          MRN:  [id number]         Date:  2022      Surgeon: * No surgeons listed *    Procedure: * No procedures listed *    Medications prior to admission:   Prior to Admission medications    Medication Sig Start Date End Date Taking?  Authorizing Provider   Prenatal Vit-Fe Fumarate-FA (PRENATAL VITAMIN PO) Take 1 tablet by mouth daily    Historical Provider, MD   albuterol sulfate  (90 Base) MCG/ACT inhaler Inhale 2 puffs into the lungs every 6 hours as needed for Wheezing    Historical Provider, MD       Current medications:    Current Facility-Administered Medications   Medication Dose Route Frequency Provider Last Rate Last Admin    lactated ringers infusion   IntraVENous Continuous Cady Juarez  mL/hr at 22 1532 New Bag at 22 1532    lactated ringers bolus  500 mL IntraVENous PRN Cady Juarez MD        Or    lactated ringers bolus  1,000 mL IntraVENous PRN Cady Juarez MD        sodium chloride flush 0.9 % injection 5-40 mL  5-40 mL IntraVENous 2 times per day Cady Juarez MD        sodium chloride flush 0.9 % injection 5-40 mL  5-40 mL IntraVENous PRN Cady Juarez MD        0.9 % sodium chloride infusion  25 mL IntraVENous PRN Cady Juarez MD        ondansetron [facility]) injection 4 mg  4 mg IntraVENous Q6H PRN Cady Juarez MD        diphenhydrAMINE (BENADRYL) tablet 25 mg  25 mg Oral Q4H PRN Cady Juarez MD        methylergonovine (METHERGINE) injection 200 mcg  200 mcg IntraMUSCular PRN Cady Juarez MD        carboprost (HEMABATE) injection 250 mcg  250 mcg IntraMUSCular PRN Cady Juarez MD        miSOPROStol (CYTOTEC) tablet 800 mcg  800 mcg Rectal PRN Cady Juarez MD        tranexamic acid-NaCl IVPB premix 1,000 mg  1,000 mg IntraVENous Once PRN Cady Juarez MD        acetaminophen (TYLENOL) tablet 1,000 mg  1,000 mg Oral Q6H PRN Kobi Christensen MD        oxytocin (PITOCIN) 30 units in 500 mL infusion  1-20 meredith-units/min IntraVENous Continuous Kobi Christensen MD 20 mL/hr at 08/30/22 1501 20 meredith-units/min at 08/30/22 1501    ropivacaine 0.2% in sodium chloride 0.9% (OB) epidural 100 mL  10 mL/hr Epidural Continuous Nakul Fisher MD           Allergies:  No Known Allergies    Problem List:    Patient Active Problem List   Diagnosis Code    Transverse fetal lie (RSLVD) O32. 2XX0    Echogenic focus of heart of fetus affecting antepartum care of mother O35. 8XX0    Supervision of normal pregnancy Z34.90    PUPP (pruritic urticarial papules and plaques of pregnancy) O26.86    Rh+/RI/GBSneg Z3A.39    Asthma J45.909    Family history of diabetes during pregnancy Z83.3       Past Medical History:        Diagnosis Date    Asthma     activity induced    Neurologic disorder 10/2021    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)    Stress     gets hives       Past Surgical History:        Procedure Laterality Date    ADENOIDECTOMY      TONSILLECTOMY      WISDOM TOOTH EXTRACTION  04/2018       Social History:    Social History     Tobacco Use    Smoking status: Never    Smokeless tobacco: Never   Substance Use Topics    Alcohol use: No     Alcohol/week: 0.0 standard drinks                                Counseling given: Not Answered      Vital Signs (Current):   Vitals:    08/30/22 1501 08/30/22 1559 08/30/22 1700 08/30/22 1708   BP: 122/81 124/60  130/78   Pulse: 89 87  (!) 106   Resp: 18 18 16    Temp: 36.6 °C (97.8 °F)  36.5 °C (97.7 °F)    TempSrc: Oral  Oral    SpO2:       Weight:       Height:                                                  BP Readings from Last 3 Encounters:   08/30/22 130/78   08/25/22 114/74   08/18/22 110/80       NPO Status:                                                                                 BMI:   Wt Readings from Last 3 Encounters:   08/30/22 157 lb (71.2 kg)   08/25/22 157 lb (71.2 kg)   08/18/22 157 lb 9.6 oz (71.5 kg)     Body mass index is 27.81 kg/m². CBC:   Lab Results   Component Value Date/Time    WBC 13.6 08/30/2022 04:30 AM    RBC 4.02 08/30/2022 04:30 AM    HGB 9.5 08/30/2022 04:30 AM    HCT 30.9 08/30/2022 04:30 AM    MCV 76.9 08/30/2022 04:30 AM    RDW 16.2 08/30/2022 04:30 AM     08/30/2022 04:30 AM       CMP:   Lab Results   Component Value Date/Time    GLUCOSE 106 06/15/2022 11:27 AM       POC Tests: No results for input(s): POCGLU, POCNA, POCK, POCCL, POCBUN, POCHEMO, POCHCT in the last 72 hours.     Coags: No results found for: PROTIME, INR, APTT    HCG (If Applicable):   Lab Results   Component Value Date    PREGTESTUR POSITIVE 02/01/2022        ABGs: No results found for: PHART, PO2ART, UZN4VCX, VCH9WDI, BEART, M6SZBTRT     Type & Screen (If Applicable):  No results found for: LABABO, LABRH    Drug/Infectious Status (If Applicable):  Lab Results   Component Value Date/Time    HEPCAB NONREACTIVE 02/01/2022 08:25 AM       COVID-19 Screening (If Applicable): No results found for: COVID19        Anesthesia Evaluation  Patient summary reviewed and Nursing notes reviewed no history of anesthetic complications:   Airway: Mallampati: II  TM distance: >3 FB   Neck ROM: full     Dental:          Pulmonary:normal exam    (+) asthma:     (-) pneumonia, COPD, shortness of breath, recent URI, sleep apnea and not a current smoker                           Cardiovascular:Negative CV ROS  Exercise tolerance: good (>4 METS),       (-) pacemaker, hypertension, valvular problems/murmurs, past MI, CAD, CABG/stent, dysrhythmias,  angina,  CHF, orthopnea, PND,  RANGEL, no pulmonary hypertension and no hyperlipidemia      Rhythm: regular  Rate: normal                    Neuro/Psych:   Negative Neuro/Psych ROS               ROS comment: Sciatica right leg, L5 disc herniation GI/Hepatic/Renal: Neg GI/Hepatic/Renal ROS Endo/Other: Negative Endo/Other ROS                    Abdominal:             Vascular: negative vascular ROS. Other Findings:           Anesthesia Plan      epidural     ASA 2             Anesthetic plan and risks discussed with patient. Plan discussed with CRNA.           Post-op pain plan if not by surgeon: continuous epidural            MINDI Bermeo - CRNA   8/30/2022

## 2022-08-31 PROBLEM — Z98.891 HISTORY OF CESAREAN SECTION: Status: ACTIVE | Noted: 2022-08-31

## 2022-08-31 LAB
CREATININE URINE: 43.4 MG/DL (ref 28–217)
TOTAL PROTEIN, URINE: 11 MG/DL
URINE TOTAL PROTEIN CREATININE RATIO: 0.25 (ref 0–0.2)

## 2022-08-31 PROCEDURE — 6360000002 HC RX W HCPCS: Performed by: ANESTHESIOLOGY

## 2022-08-31 PROCEDURE — 59510 CESAREAN DELIVERY: CPT | Performed by: STUDENT IN AN ORGANIZED HEALTH CARE EDUCATION/TRAINING PROGRAM

## 2022-08-31 PROCEDURE — 2500000003 HC RX 250 WO HCPCS: Performed by: NURSE ANESTHETIST, CERTIFIED REGISTERED

## 2022-08-31 PROCEDURE — 2580000003 HC RX 258: Performed by: NURSE ANESTHETIST, CERTIFIED REGISTERED

## 2022-08-31 PROCEDURE — 6370000000 HC RX 637 (ALT 250 FOR IP)

## 2022-08-31 PROCEDURE — 3E0E77Z INTRODUCTION OF ELECTROLYTIC AND WATER BALANCE SUBSTANCE INTO PRODUCTS OF CONCEPTION, VIA NATURAL OR ARTIFICIAL OPENING: ICD-10-PCS | Performed by: STUDENT IN AN ORGANIZED HEALTH CARE EDUCATION/TRAINING PROGRAM

## 2022-08-31 PROCEDURE — A4216 STERILE WATER/SALINE, 10 ML: HCPCS | Performed by: STUDENT IN AN ORGANIZED HEALTH CARE EDUCATION/TRAINING PROGRAM

## 2022-08-31 PROCEDURE — 7100000000 HC PACU RECOVERY - FIRST 15 MIN: Performed by: STUDENT IN AN ORGANIZED HEALTH CARE EDUCATION/TRAINING PROGRAM

## 2022-08-31 PROCEDURE — 96375 TX/PRO/DX INJ NEW DRUG ADDON: CPT

## 2022-08-31 PROCEDURE — 82570 ASSAY OF URINE CREATININE: CPT

## 2022-08-31 PROCEDURE — 2500000003 HC RX 250 WO HCPCS: Performed by: STUDENT IN AN ORGANIZED HEALTH CARE EDUCATION/TRAINING PROGRAM

## 2022-08-31 PROCEDURE — 6360000002 HC RX W HCPCS: Performed by: NURSE ANESTHETIST, CERTIFIED REGISTERED

## 2022-08-31 PROCEDURE — 84156 ASSAY OF PROTEIN URINE: CPT

## 2022-08-31 PROCEDURE — 3700000000 HC ANESTHESIA ATTENDED CARE: Performed by: STUDENT IN AN ORGANIZED HEALTH CARE EDUCATION/TRAINING PROGRAM

## 2022-08-31 PROCEDURE — 6370000000 HC RX 637 (ALT 250 FOR IP): Performed by: STUDENT IN AN ORGANIZED HEALTH CARE EDUCATION/TRAINING PROGRAM

## 2022-08-31 PROCEDURE — 3700000001 HC ADD 15 MINUTES (ANESTHESIA): Performed by: STUDENT IN AN ORGANIZED HEALTH CARE EDUCATION/TRAINING PROGRAM

## 2022-08-31 PROCEDURE — 2580000003 HC RX 258

## 2022-08-31 PROCEDURE — 2580000003 HC RX 258: Performed by: STUDENT IN AN ORGANIZED HEALTH CARE EDUCATION/TRAINING PROGRAM

## 2022-08-31 PROCEDURE — 6360000002 HC RX W HCPCS: Performed by: STUDENT IN AN ORGANIZED HEALTH CARE EDUCATION/TRAINING PROGRAM

## 2022-08-31 PROCEDURE — 1220000000 HC SEMI PRIVATE OB R&B

## 2022-08-31 PROCEDURE — 6360000002 HC RX W HCPCS

## 2022-08-31 PROCEDURE — 2709999900 HC NON-CHARGEABLE SUPPLY: Performed by: STUDENT IN AN ORGANIZED HEALTH CARE EDUCATION/TRAINING PROGRAM

## 2022-08-31 PROCEDURE — 3609079900 HC CESAREAN SECTION: Performed by: STUDENT IN AN ORGANIZED HEALTH CARE EDUCATION/TRAINING PROGRAM

## 2022-08-31 PROCEDURE — 96374 THER/PROPH/DIAG INJ IV PUSH: CPT

## 2022-08-31 PROCEDURE — 3E033VJ INTRODUCTION OF OTHER HORMONE INTO PERIPHERAL VEIN, PERCUTANEOUS APPROACH: ICD-10-PCS | Performed by: STUDENT IN AN ORGANIZED HEALTH CARE EDUCATION/TRAINING PROGRAM

## 2022-08-31 PROCEDURE — 7100000001 HC PACU RECOVERY - ADDTL 15 MIN: Performed by: STUDENT IN AN ORGANIZED HEALTH CARE EDUCATION/TRAINING PROGRAM

## 2022-08-31 RX ORDER — IBUPROFEN 600 MG/1
600 TABLET ORAL EVERY 6 HOURS
Status: DISCONTINUED | OUTPATIENT
Start: 2022-09-01 | End: 2022-09-02 | Stop reason: HOSPADM

## 2022-08-31 RX ORDER — LANOLIN 72 %
OINTMENT (GRAM) TOPICAL
Status: DISCONTINUED | OUTPATIENT
Start: 2022-08-31 | End: 2022-09-02 | Stop reason: HOSPADM

## 2022-08-31 RX ORDER — ACETAMINOPHEN 500 MG
1000 TABLET ORAL ONCE
Status: COMPLETED | OUTPATIENT
Start: 2022-08-31 | End: 2022-08-31

## 2022-08-31 RX ORDER — METHYLERGONOVINE MALEATE 0.2 MG/ML
INJECTION INTRAVENOUS PRN
Status: DISCONTINUED | OUTPATIENT
Start: 2022-08-31 | End: 2022-08-31 | Stop reason: SDUPTHER

## 2022-08-31 RX ORDER — OXYCODONE HYDROCHLORIDE 5 MG/1
10 TABLET ORAL EVERY 4 HOURS PRN
Status: DISCONTINUED | OUTPATIENT
Start: 2022-08-31 | End: 2022-09-02 | Stop reason: HOSPADM

## 2022-08-31 RX ORDER — IBUPROFEN 600 MG/1
600 TABLET ORAL EVERY 6 HOURS PRN
Qty: 40 TABLET | Refills: 1 | Status: SHIPPED | OUTPATIENT
Start: 2022-08-31 | End: 2022-10-12 | Stop reason: ALTCHOICE

## 2022-08-31 RX ORDER — NALOXONE HYDROCHLORIDE 0.4 MG/ML
0.4 INJECTION, SOLUTION INTRAMUSCULAR; INTRAVENOUS; SUBCUTANEOUS PRN
Status: DISCONTINUED | OUTPATIENT
Start: 2022-08-31 | End: 2022-09-02 | Stop reason: HOSPADM

## 2022-08-31 RX ORDER — TRISODIUM CITRATE DIHYDRATE AND CITRIC ACID MONOHYDRATE 500; 334 MG/5ML; MG/5ML
30 SOLUTION ORAL ONCE
Status: COMPLETED | OUTPATIENT
Start: 2022-08-31 | End: 2022-08-31

## 2022-08-31 RX ORDER — POLYETHYLENE GLYCOL 3350 17 G/17G
17 POWDER, FOR SOLUTION ORAL DAILY
Status: DISCONTINUED | OUTPATIENT
Start: 2022-08-31 | End: 2022-09-02 | Stop reason: HOSPADM

## 2022-08-31 RX ORDER — TRANEXAMIC ACID 10 MG/ML
1000 INJECTION, SOLUTION INTRAVENOUS ONCE
Status: COMPLETED | OUTPATIENT
Start: 2022-08-31 | End: 2022-08-31

## 2022-08-31 RX ORDER — ROPIVACAINE HYDROCHLORIDE 2 MG/ML
INJECTION, SOLUTION EPIDURAL; INFILTRATION; PERINEURAL PRN
Status: DISCONTINUED | OUTPATIENT
Start: 2022-08-31 | End: 2022-08-31 | Stop reason: SDUPTHER

## 2022-08-31 RX ORDER — ACETAMINOPHEN 500 MG
1000 TABLET ORAL EVERY 6 HOURS
Status: DISCONTINUED | OUTPATIENT
Start: 2022-08-31 | End: 2022-09-02 | Stop reason: HOSPADM

## 2022-08-31 RX ORDER — OXYCODONE HYDROCHLORIDE 5 MG/1
5 TABLET ORAL EVERY 4 HOURS PRN
Status: DISCONTINUED | OUTPATIENT
Start: 2022-08-31 | End: 2022-09-02 | Stop reason: HOSPADM

## 2022-08-31 RX ORDER — FENTANYL CITRATE 50 UG/ML
INJECTION, SOLUTION INTRAMUSCULAR; INTRAVENOUS PRN
Status: DISCONTINUED | OUTPATIENT
Start: 2022-08-31 | End: 2022-08-31 | Stop reason: SDUPTHER

## 2022-08-31 RX ORDER — SODIUM CHLORIDE, SODIUM LACTATE, POTASSIUM CHLORIDE, CALCIUM CHLORIDE 600; 310; 30; 20 MG/100ML; MG/100ML; MG/100ML; MG/100ML
INJECTION, SOLUTION INTRAVENOUS CONTINUOUS
Status: DISCONTINUED | OUTPATIENT
Start: 2022-08-31 | End: 2022-09-02 | Stop reason: HOSPADM

## 2022-08-31 RX ORDER — DOCUSATE SODIUM 100 MG/1
100 CAPSULE, LIQUID FILLED ORAL 2 TIMES DAILY PRN
Qty: 60 CAPSULE | Refills: 0 | Status: SHIPPED | OUTPATIENT
Start: 2022-08-31 | End: 2022-10-12 | Stop reason: ALTCHOICE

## 2022-08-31 RX ORDER — LIDOCAINE HYDROCHLORIDE 20 MG/ML
INJECTION, SOLUTION EPIDURAL; INFILTRATION; INTRACAUDAL; PERINEURAL PRN
Status: DISCONTINUED | OUTPATIENT
Start: 2022-08-31 | End: 2022-08-31 | Stop reason: SDUPTHER

## 2022-08-31 RX ORDER — METRONIDAZOLE 500 MG/1
500 TABLET ORAL EVERY 8 HOURS SCHEDULED
Status: DISCONTINUED | OUTPATIENT
Start: 2022-08-31 | End: 2022-08-31

## 2022-08-31 RX ORDER — PHENYLEPHRINE HCL IN 0.9% NACL 1 MG/10 ML
SYRINGE (ML) INTRAVENOUS PRN
Status: DISCONTINUED | OUTPATIENT
Start: 2022-08-31 | End: 2022-08-31 | Stop reason: SDUPTHER

## 2022-08-31 RX ORDER — ONDANSETRON 2 MG/ML
INJECTION INTRAMUSCULAR; INTRAVENOUS PRN
Status: DISCONTINUED | OUTPATIENT
Start: 2022-08-31 | End: 2022-08-31 | Stop reason: SDUPTHER

## 2022-08-31 RX ORDER — MORPHINE SULFATE 1 MG/ML
INJECTION, SOLUTION EPIDURAL; INTRATHECAL; INTRAVENOUS PRN
Status: DISCONTINUED | OUTPATIENT
Start: 2022-08-31 | End: 2022-08-31 | Stop reason: SDUPTHER

## 2022-08-31 RX ORDER — CEPHALEXIN 500 MG/1
500 CAPSULE ORAL EVERY 8 HOURS SCHEDULED
Status: DISCONTINUED | OUTPATIENT
Start: 2022-08-31 | End: 2022-08-31

## 2022-08-31 RX ORDER — SODIUM CHLORIDE 0.9 % (FLUSH) 0.9 %
5-40 SYRINGE (ML) INJECTION EVERY 12 HOURS SCHEDULED
Status: DISCONTINUED | OUTPATIENT
Start: 2022-08-31 | End: 2022-09-02 | Stop reason: HOSPADM

## 2022-08-31 RX ORDER — VITAMIN A, ASCORBIC ACID, CHOLECALCIFEROL, .ALPHA.-TOCOPHEROL ACETATE, DL-, THIAMINE MONONITRATE, RIBOFLAVIN, NIACINAMIDE, PYRIDOXINE HYDROCHLORIDE, FOLIC ACID, CYANOCOBALAMIN, CALCIUM CARBONATE, IRON, ZINC OXIDE, AND CUPRIC OXIDE 4000; 120; 400; 22; 1.84; 3; 20; 10; 1; 12; 200; 29; 25; 2 [IU]/1; MG/1; [IU]/1; [IU]/1; MG/1; MG/1; MG/1; MG/1; MG/1; UG/1; MG/1; MG/1; MG/1; MG/1
1 TABLET ORAL DAILY
Status: DISCONTINUED | OUTPATIENT
Start: 2022-08-31 | End: 2022-09-02 | Stop reason: HOSPADM

## 2022-08-31 RX ORDER — SODIUM CHLORIDE, SODIUM LACTATE, POTASSIUM CHLORIDE, CALCIUM CHLORIDE 600; 310; 30; 20 MG/100ML; MG/100ML; MG/100ML; MG/100ML
INJECTION, SOLUTION INTRAVENOUS CONTINUOUS PRN
Status: DISCONTINUED | OUTPATIENT
Start: 2022-08-31 | End: 2022-08-31 | Stop reason: SDUPTHER

## 2022-08-31 RX ORDER — DIPHENHYDRAMINE HYDROCHLORIDE 50 MG/ML
25 INJECTION INTRAMUSCULAR; INTRAVENOUS EVERY 6 HOURS PRN
Status: DISCONTINUED | OUTPATIENT
Start: 2022-08-31 | End: 2022-09-02 | Stop reason: HOSPADM

## 2022-08-31 RX ORDER — ONDANSETRON 2 MG/ML
4 INJECTION INTRAMUSCULAR; INTRAVENOUS EVERY 6 HOURS PRN
Status: DISCONTINUED | OUTPATIENT
Start: 2022-08-31 | End: 2022-09-02 | Stop reason: HOSPADM

## 2022-08-31 RX ORDER — DIPHENHYDRAMINE HYDROCHLORIDE 50 MG/ML
50 INJECTION INTRAMUSCULAR; INTRAVENOUS ONCE
Status: COMPLETED | OUTPATIENT
Start: 2022-08-31 | End: 2022-08-31

## 2022-08-31 RX ORDER — SIMETHICONE 80 MG
80 TABLET,CHEWABLE ORAL EVERY 6 HOURS PRN
Status: DISCONTINUED | OUTPATIENT
Start: 2022-08-31 | End: 2022-09-02 | Stop reason: HOSPADM

## 2022-08-31 RX ORDER — CHLOROPROCAINE HYDROCHLORIDE 30 MG/ML
INJECTION, SOLUTION EPIDURAL; INFILTRATION; INTRACAUDAL; PERINEURAL PRN
Status: DISCONTINUED | OUTPATIENT
Start: 2022-08-31 | End: 2022-08-31 | Stop reason: SDUPTHER

## 2022-08-31 RX ORDER — OXYCODONE HYDROCHLORIDE 5 MG/1
5 TABLET ORAL EVERY 6 HOURS PRN
Qty: 20 TABLET | Refills: 0 | Status: SHIPPED | OUTPATIENT
Start: 2022-08-31 | End: 2022-09-05

## 2022-08-31 RX ORDER — NALOXONE HYDROCHLORIDE 0.4 MG/ML
INJECTION, SOLUTION INTRAMUSCULAR; INTRAVENOUS; SUBCUTANEOUS PRN
Status: DISCONTINUED | OUTPATIENT
Start: 2022-08-31 | End: 2022-08-31

## 2022-08-31 RX ORDER — KETOROLAC TROMETHAMINE 30 MG/ML
30 INJECTION, SOLUTION INTRAMUSCULAR; INTRAVENOUS EVERY 6 HOURS
Status: COMPLETED | OUTPATIENT
Start: 2022-08-31 | End: 2022-08-31

## 2022-08-31 RX ORDER — SODIUM CHLORIDE 0.9 % (FLUSH) 0.9 %
5-40 SYRINGE (ML) INJECTION PRN
Status: DISCONTINUED | OUTPATIENT
Start: 2022-08-31 | End: 2022-09-02 | Stop reason: HOSPADM

## 2022-08-31 RX ORDER — DOCUSATE SODIUM 100 MG/1
100 CAPSULE, LIQUID FILLED ORAL 2 TIMES DAILY
Status: DISCONTINUED | OUTPATIENT
Start: 2022-08-31 | End: 2022-09-02 | Stop reason: HOSPADM

## 2022-08-31 RX ORDER — SODIUM CHLORIDE 9 MG/ML
INJECTION, SOLUTION INTRAVENOUS PRN
Status: DISCONTINUED | OUTPATIENT
Start: 2022-08-31 | End: 2022-09-02 | Stop reason: HOSPADM

## 2022-08-31 RX ADMIN — MORPHINE SULFATE 3 MG: 1 INJECTION, SOLUTION EPIDURAL; INTRATHECAL; INTRAVENOUS at 05:16

## 2022-08-31 RX ADMIN — CHLOROPROCAINE HYDROCHLORIDE 10 ML: 30 INJECTION, SOLUTION EPIDURAL; INFILTRATION; INTRACAUDAL; PERINEURAL at 05:24

## 2022-08-31 RX ADMIN — METHYLERGONOVINE MALEATE 200 MCG: 0.2 INJECTION, SOLUTION INTRAMUSCULAR; INTRAVENOUS at 05:16

## 2022-08-31 RX ADMIN — ONDANSETRON 4 MG: 2 INJECTION INTRAMUSCULAR; INTRAVENOUS at 04:57

## 2022-08-31 RX ADMIN — ACETAMINOPHEN 1000 MG: 500 TABLET ORAL at 23:51

## 2022-08-31 RX ADMIN — LIDOCAINE HYDROCHLORIDE 3 ML: 20 INJECTION, SOLUTION EPIDURAL; INFILTRATION; INTRACAUDAL; PERINEURAL at 05:22

## 2022-08-31 RX ADMIN — DOCUSATE SODIUM 100 MG: 100 CAPSULE ORAL at 23:53

## 2022-08-31 RX ADMIN — ACETAMINOPHEN 1000 MG: 500 TABLET ORAL at 12:51

## 2022-08-31 RX ADMIN — KETOROLAC TROMETHAMINE 30 MG: 30 INJECTION, SOLUTION INTRAMUSCULAR; INTRAVENOUS at 06:41

## 2022-08-31 RX ADMIN — CHLOROPROCAINE HYDROCHLORIDE 18 ML: 30 INJECTION, SOLUTION EPIDURAL; INFILTRATION; INTRACAUDAL; PERINEURAL at 04:53

## 2022-08-31 RX ADMIN — ACETAMINOPHEN 1000 MG: 500 TABLET ORAL at 04:22

## 2022-08-31 RX ADMIN — HYDROMORPHONE HYDROCHLORIDE 0.5 MG: 1 INJECTION, SOLUTION INTRAMUSCULAR; INTRAVENOUS; SUBCUTANEOUS at 07:02

## 2022-08-31 RX ADMIN — KETOROLAC TROMETHAMINE 30 MG: 30 INJECTION, SOLUTION INTRAMUSCULAR; INTRAVENOUS at 19:00

## 2022-08-31 RX ADMIN — SODIUM CITRATE AND CITRIC ACID MONOHYDRATE 30 ML: 500; 334 SOLUTION ORAL at 04:22

## 2022-08-31 RX ADMIN — FENTANYL CITRATE 100 MCG: 50 INJECTION, SOLUTION INTRAMUSCULAR; INTRAVENOUS at 04:53

## 2022-08-31 RX ADMIN — ROPIVACAINE HYDROCHLORIDE 10 ML/HR: 2 INJECTION, SOLUTION EPIDURAL; INFILTRATION at 01:11

## 2022-08-31 RX ADMIN — Medication 100 MCG: at 05:28

## 2022-08-31 RX ADMIN — TRANEXAMIC ACID 1000 MG: 10 INJECTION, SOLUTION INTRAVENOUS at 04:24

## 2022-08-31 RX ADMIN — ROPIVACAINE HYDROCHLORIDE 8 ML: 2 INJECTION, SOLUTION EPIDURAL; INFILTRATION; PERINEURAL at 02:01

## 2022-08-31 RX ADMIN — Medication 2000 MG: at 04:22

## 2022-08-31 RX ADMIN — OXYCODONE 10 MG: 5 TABLET ORAL at 06:41

## 2022-08-31 RX ADMIN — LIDOCAINE HYDROCHLORIDE 7 ML: 20 INJECTION, SOLUTION EPIDURAL; INFILTRATION; INTRACAUDAL; PERINEURAL at 05:16

## 2022-08-31 RX ADMIN — Medication 500 MG: at 05:19

## 2022-08-31 RX ADMIN — Medication 909 ML/HR: at 05:14

## 2022-08-31 RX ADMIN — FAMOTIDINE 20 MG: 10 INJECTION, SOLUTION INTRAVENOUS at 04:22

## 2022-08-31 RX ADMIN — IBUPROFEN 600 MG: 600 TABLET, FILM COATED ORAL at 23:52

## 2022-08-31 RX ADMIN — SODIUM CHLORIDE, POTASSIUM CHLORIDE, SODIUM LACTATE AND CALCIUM CHLORIDE: 600; 310; 30; 20 INJECTION, SOLUTION INTRAVENOUS at 04:50

## 2022-08-31 RX ADMIN — SODIUM CHLORIDE, POTASSIUM CHLORIDE, SODIUM LACTATE AND CALCIUM CHLORIDE: 600; 310; 30; 20 INJECTION, SOLUTION INTRAVENOUS at 07:44

## 2022-08-31 RX ADMIN — KETOROLAC TROMETHAMINE 30 MG: 30 INJECTION, SOLUTION INTRAMUSCULAR; INTRAVENOUS at 12:50

## 2022-08-31 RX ADMIN — DIPHENHYDRAMINE HYDROCHLORIDE 50 MG: 50 INJECTION INTRAMUSCULAR; INTRAVENOUS at 03:00

## 2022-08-31 RX ADMIN — ACETAMINOPHEN 1000 MG: 500 TABLET ORAL at 19:00

## 2022-08-31 ASSESSMENT — PAIN SCALES - GENERAL
PAINLEVEL_OUTOF10: 1
PAINLEVEL_OUTOF10: 1
PAINLEVEL_OUTOF10: 2
PAINLEVEL_OUTOF10: 10
PAINLEVEL_OUTOF10: 6

## 2022-08-31 ASSESSMENT — PAIN DESCRIPTION - DESCRIPTORS: DESCRIPTORS: SORE;TENDER

## 2022-08-31 ASSESSMENT — PAIN - FUNCTIONAL ASSESSMENT
PAIN_FUNCTIONAL_ASSESSMENT: ACTIVITIES ARE NOT PREVENTED
PAIN_FUNCTIONAL_ASSESSMENT: ACTIVITIES ARE NOT PREVENTED

## 2022-08-31 ASSESSMENT — PAIN DESCRIPTION - LOCATION: LOCATION: INCISION

## 2022-08-31 NOTE — PROGRESS NOTES
Labor Progress Note    Dax Lozano is a 25 y.o. female  at 36w3d  The patient was seen and examined. Writer to bedside 2/2 late decelerations. SVE performed at bedside. Fluid bolus and position changes initiated. Her pain is well controlled. She reports fetal movement is present, complains of contractions, complains of loss of fluid, denies vaginal bleeding. Vital Signs:  Vitals:    22 2301 22 2330 22 0030 22 0100   BP: 115/71 (!) 108/46 (!) 120/95 (!) 111/53   Pulse: 79 76 76 85   Resp:   16    Temp: 98.3 °F (36.8 °C)  98.3 °F (36.8 °C)    TempSrc: Oral  Oral    SpO2:       Weight:       Height:             FHT: 120, moderate variability, accelerations present, one prolonged deceleration with shila to high 90s and multiple late decelerations. Pitocin turned off at this time, fluid bolus and position changes initiated. Category 1 FHT following interventions. Contractions: regular, every 2 minutes    Chaperone for Intimate Exam: Chaperone was present for entire exam, Chaperone Name: [facility], RN   Cervical Exam: 7 cm dilated, 70 effaced, -1 station  Pitocin: turned off at this time     Membranes: Ruptured clear fluid  Scalp Electrode in place: absent  Intrauterine Pressure Catheter in Place: present    Interventions: SVE, d/c pitocin, position changes, fluid bolus     Assessment/Plan:  Dax Lozano is a 25 y.o. female  at 39w1d admitted for Spontaneous Labor               - GBS negative, No indication for GBS prophylaxis              - VSS               - cEFM/TOCO: one prolonged deceleration with shila to high 90s and multiple late decelerations. Pitocin turned off at this time, fluid bolus and position changes initiated.  Category 1 FHT following interventions              - Epidural in place               - S/p AROM (clear)               - IUPC in place               - S/p amnioinfusion               - Pitocin turned off at this time, will resume per protocol if able - Continue current care     Attending updated and in agreement with plan    Andrey Parikh MD  Ob/Gyn Resident  8/31/2022, 3:17 AM

## 2022-08-31 NOTE — PROGRESS NOTES
Labor Progress Note    Rob Koyanagi is a 25 y.o. female  at 36w3d  The patient was seen and examined. Writer to bedside for recurrent variable decelerations with spontaneous return to baseline. IUPC placed at bedside and Amnioinfusion initiated. Patient tolerated procedure well. Her pain is well controlled. She reports fetal movement is present, complains of contractions, complains of loss of fluid, denies vaginal bleeding. Vital Signs:  Vitals:    22 2301 22 2330 22 0030 22 0100   BP: 115/71 (!) 108/46 (!) 120/95 (!) 111/53   Pulse: 79 76 76 85   Resp:   16    Temp: 98.3 °F (36.8 °C)  98.3 °F (36.8 °C)    TempSrc: Oral  Oral    SpO2:       Weight:       Height:             FHT: 120, moderate variability, accelerations present, recurrent variable decelerations with spontaneous return to baseline present. Recurrent variable decelerations. Variable decelerations subsided following initiation of amnioinfusion. Category 1 FHT following amnioinfusion without additional decelerations. Contractions: regular, every 3 minutes    Chaperone for Intimate Exam: Chaperone was present for entire exam, Chaperone Name: Lasha Barrientos RN   Cervical Exam: 6 cm dilated, 70 effaced, -1 station  Pitocin: @ 10 mu/min    Membranes: Ruptured clear fluid  Scalp Electrode in place: absent  Intrauterine Pressure Catheter in Place: absent    Interventions: SVE, IUPC, Amnioinfusion     Assessment/Plan:  Rob Koyanagi is a 25 y.o. female  at 39w1d admitted for Spontaneous Labor               - GBS negative, No indication for GBS prophylaxis              - VSS               - cEFM/TOCO: recurrent variable decelerations with spontaneous return to baseline present. Recurrent variable decelerations. Variable decelerations subsided following initiation of amnioinfusion. Category 1 FHT following amnioinfusion without additional decelerations.                - Epidural in place               - S/p AROM (clear)    - IUPC in place    - S/p amnioinfusion               - Pitocin per protocol               - Continue current care     Attending updated and in agreement with plan    Cara Lopez MD  Ob/Gyn Resident  8/31/2022, 12:16 AM

## 2022-08-31 NOTE — OP NOTE
dissection. The fascia was  from the underlying rectus tissue superiorly and inferiorly using blunt dissection. The peritoneum was identified and entered bluntly. Peritoneal incision was extended longitudinally with blunt stretch, bladder retractor was placed. A low vertical uterine incision was made using a new scalpel blade. Blunt stretch on the hysterotomy incision was made and the amniotomy was performed revealing Ruptured clear fluid fluid. Delivered from cephalic presentation was a Live Born female infant. The infant was suctioned, dried and the umbilical cord was clamped and cut after one minute delayed cord clamping. The infant was taken to the warmer and attended by NICU for evaluation. A second section of cord was clamped and cut and sent for gases. Cord blood was obtained for evaluation. The placenta was removed manually and spontaneously with gentle traction and appeared intact, whole, and that the umbilical cord had three vessels noted. Pitocin was started. The uterine outline appeared normal. The uterus was exteriorized and cleaned of all clots and debris. The uterine incision was closed with running unlocked sutures of 0 Vicryl. Methergine 2.0mg IM was ordered intraoperatively to treat residual uterine atony. A figure of eight suture was needed in the midline of the hysterotomy incision to obtain excellent hemostasis. Hemostasis was observed. The uterus was reintroduced into the abdominal cavity. Bilateral abdominal gutters were cleared of all clots and debris. Bilateral tubes and ovaries were visualized and appeared normal. The hysterotomy was again inspected and found to be hemostatic. Rectus muscles were inspected and found to be hemostatic. The fascia was then reapproximated with running sutures of 0 Vicryl. The skin was reapproximated with a 4-0 Monocryl. The skin was then cleansed and dressed with a silver dressing in sterile fashion.      Instrument, sponge, and needle counts were correct prior the abdominal closure and at the conclusion of the case. The urine remained clear throughout the case. Ancef and azithromycin was given for antibiotic prophylaxis. SCDs for DVT prophylaxis remain in place for the post operative period. Dr. Bela Moses was present for the entire operation. Findings:  Live Born 7#13 female infant in cephalic presentation with Apgars of 8 at 1 minute and 9 at five minutes, normal appearing uterus tubes and ovaries   Quantitative Blood Loss: 1025 ml  Total IV Fluids: 1000ml  Urine output: 400ml clear urine   Drains:  pelaez catheter  Specimens:  placenta discarded, cord blood, and cord gases  Instrument and Sponge Count: Correct  Complications: none  Condition: Infant stable, transfer to [address] [facility], Mother stable, transfer to post anesthesia recovery      Sameer Patrick MD  OB/GYN Resident  8/31/2022, 6:03 AM    This dictation was performed by a resident physician and then was thoroughly reviewed by the attending prior to being signed. Date: 8/31/2022  Time: 10:39 AM    Attending Attestation:   I was present and scrubbed for the entire procedure.     Attending Name: Rosa Almaraz DO

## 2022-08-31 NOTE — CARE COORDINATION
CASE MANAGEMENT POST-PARTUM TRANSITIONAL CARE PLAN    39 weeks gestation of pregnancy [Z3A.39]    OB Provider: Dr. Kemar Fry met w/ Yuri Hankins, her BF/FOB Fannie Javier and her mom at bedside to discuss DCP. She is S/P C/S on 2022 @ 39w1d at [phone] of female    Writer verified name/address/phone number correct on facesheet. She states she lives with her parents and FOB Juan José Mckeon. Yuri Hankins verbalized no problems with transportation to and from doctors appointments or with paying for medications upon discharge home. [employer] insurance correct. Writer notified Yuri Hankins and Juan José Mckeon they have 30 days from date of birth to add  to insurance policy. They verbalized understanding and Juan José Mckeon will be adding to his insurance as Yuri Hankins is insured under her parents. Yuri Hankins and Juan José Mckeon confirmed a safe place for infant to sleep at home. Infant name on BC: [place]. Infant PCP Dr. Laura Francisco.      DME: none  HOME CARE: none    Anticipate DC of couplet 2022    Readmission Risk              Risk of Unplanned Readmission:  5

## 2022-08-31 NOTE — PROGRESS NOTES
Labor Progress Note    Yecenia Marsh is a 25 y.o. female  at 39w0d  The patient was seen and examined. Her pain is well controlled with epidural. She reports fetal movement is present, complains of contractions, complains of loss of fluid, denies vaginal bleeding.        Vital Signs:  Vitals:    22 2030 22 2100 22 2200   BP: 123/79 (!) 110/50 (!) 124/54 117/70   Pulse: 91 73 77 80   Resp: 16  16 16   Temp:   98.2 °F (36.8 °C)    TempSrc:   Oral    SpO2:       Weight:       Height:           FHT: 120, moderate variability, accelerations present, variable decelerations occurring with intermittent contractions improved with position changes  Contractions: regular, every 3-4 minutes    Chaperone for Intimate Exam: Chaperone was present for entire exam, Chaperone Name: Roberto Kenny RN  Cervical Exam: 5 cm dilated, 70 effaced, -1 station  Pitocin: @ 20 mu/min    Membranes: Ruptured clear fluid  Scalp Electrode in place: absent  Intrauterine Pressure Catheter in Place: absent    Assessment/Plan:  Yecenia Marsh is a 25 y.o. female  at 39w0d admitted for Spontaneous Labor   - GBS negative, No indication for GBS prophylaxis   - SVE: 5, 70, -1    - AROM @ 1430 (clr)  - Pitocin, continue per protocol  - S/p Morphine/Compazine x1  - Epidural in place and functioning   - Continue current management      Attending updated and in agreement with plan    Tai Adames DO  Ob/Gyn Resident  2022, 10:41 PM

## 2022-08-31 NOTE — FLOWSHEET NOTE
Patient admitted to room 750 from l&d via stretcher. Oriented to room and surroundings. Plan of care reviewed. Verbalized understanding. Instructed on infant security and safe sleep practices. Preventing falls education provided . The following handouts given: A New Beginning: Your Guide to Postpartum Care, Rounding, gs Security System,Babies Cry A lot, Safe Sleep, Security and Visitation Guidelines. Call light placed within reach.

## 2022-08-31 NOTE — PLAN OF CARE
Problem: Pain  Goal: Verbalizes/displays adequate comfort level or baseline comfort level  Outcome: Progressing   Pt. Encouraged to track pain using a 0-10 scale and notify RN of any pain, precipitating or alleviating factors. Pain medication given PRN as ordered (See MAR). RN will continue to monitor. Problem: Vaginal Birth or  Section  Goal: Fetal and maternal status remain reassuring during the birth process  Description:  Birth OB-Pregnancy care plan goal which identifies if the fetal and maternal status remain reassuring during the birth process  Outcome: Progressing  Flowsheets (Taken 2022)  Fetal and Maternal Status Remain Reassuring During the Birth Process:   Monitor vital signs   Monitor fetal heart rate   Monitor uterine activity   Monitor labor progression (Vaginal delivery)     Patient and fetus monitored throughout the labor process. EFM used to monitor the status of fetus. Patient encouraged to ask questions throughout the birth process. Problem: Infection - Adult  Goal: Absence of infection during hospitalization  Outcome: Progressing   Patient remains free from infection. No s/s of infection at this time. RN will continue to monitor. Problem: Safety - Adult  Goal: Free from fall injury  Outcome: Progressing  Flowsheets (Taken 2022)  Free From Fall Injury: Instruct family/caregiver on patient safety     Pt. Will remain free from falls immediately during shift. Call light within reach. Surrounding free and clear from clutter. Pt. Encouraged to use call light when ambulating. Will continue to monitor.

## 2022-08-31 NOTE — CONSULTS
Breastfeeding packet given. Reviewed early hunger cues, normal  feeding patterns, and hand expression. Assisted with latch/positioning, baby suck slightly disorganized but able to sustain a good pattern after a few attempts. Mom is sleepy at this time, encouraged to let baby finish feeding and take a rest, call out with next attempt.

## 2022-08-31 NOTE — BRIEF OP NOTE
Department of Obstetrics and Gynecology  Obstetrical Brief Operative Report  [facility]    Patient: Shaye Greco   : 2000  MRN: [id number]       Acct: [de-identified]   Date of Procedure: 22    Pre-operative Diagnosis: 25 y.o. female  at 39w1d   Spontaneous Labor  Elv BP x1  Anemia     Asthma    PUPP     FHx DM     Fetal Echogenic Foci    BMI 27      Post-operative Diagnosis: Same as above and  living infant     Procedure: primary low transverse  section    Surgeon: Dr. Singh [facility]  Assistant(s): Mariaa Martinez DO, PGY3; Renuka Cardona MD, PGY1    Anesthesia: epidural with Duramorph    Information for the patient's :  Alexander Slipper [[id number]]   child   Birth Weight: 7 lb 13.4 oz (3.555 kg)   Information for the patient's :  Alexander Slipper [[id number]]        Findings:  Live Born 7#13 female infant in cephalic presentation with Apgars of 8 at 1 minute and 9 at five minutes, normal appearing uterus tubes and ovaries   Estimated Blood Loss: 1000ml immediately post-operatively  Total IV Fluids: 1000ml  Urine output: 400ml clear urine   Drains:  pelaez catheter  Specimens:  placenta discarded, cord blood, and cord gases  Instrument and Sponge Count: Correct  Complications: none  Condition: Infant stable, transfer to [facility], Mother stable, transfer to post anesthesia recovery    See dictated operative report for full details. Renuka Cardona MD  Ob/Gyn Resident  2022, 5:56 AM     Date: 2022  Time: 10:40 AM    Attending Attestation:   I was present and scrubbed for the entire procedure.     Attending Name: Yessy Lopez DO

## 2022-08-31 NOTE — LACTATION NOTE
In to assist with feed, mom has great technique in cross cradle hold, baby will latch but pulls away frequently and has difficulty maintaining. Encouraged frequent attempts and hand expression.

## 2022-08-31 NOTE — PROGRESS NOTES
Labor Progress Note    Dax Lozano is a 25 y.o. female  at 36w3d    Presented to bedside due to prolonged deceleration with shila to the 60s lasting approximately 4 minutes with return to baseline after position change. This is in addition to recent late decelerations occurring with greater than 50% of contractions. SVE performed and patient remains at 7, 70, -1 station with cervical swelling and suspected asynclitic fetal lie. Discussed with patient now prolonged deceleration x2 in addition to late decelerations. Pitocin has been turned off for 30 minutes and patient has been repositioned multiple times. Tracing is persistently category 2 and suspect that patient is remote from delivery given limited cervical change in the past 4 hours. Discussed R/B/A of proceeding with trial of labor versus  section. Patient opts to proceed with  section. Consent obtained. Ancef ordered. Vital Signs:  Vitals:    22 0100   BP: (!) 111/53   Pulse: 85   Resp:    Temp:    SpO2:        Chaperone for Intimate Exam: Chaperone was present for entire exam, Chaperone Name: Paula Burns RN  Cervical Exam: 7 cm dilated, 70 effaced, -1 station  Pitocin: @ 0 mu/min    Membranes: Ruptured clear fluid  Scalp Electrode in place: absent  Intrauterine Pressure Catheter in Place: present    Assessment/Plan:  Dax Lozano is a 25 y.o. female  at 36w3d admitted for Spontaneous Labor   - S/p IUPC/Amnio  - AROM @ 1430 (clr)  - Pitocin initiated 0430 , turned off >30 minutes ago  - S/p Morphine/Compazine x1  - Epidural in place and functioning   - Plan to proceed with  delivery for category 2 tracing remote from delivery and maternal request    Attending updated and in agreement with plan.     Miguelangel Brooks DO  Ob/Gyn Resident  2022, 3:50 AM

## 2022-08-31 NOTE — FLOWSHEET NOTE
Moving side to side and able to lift bottom off the bed for pericare. Dad visited. FOB supportive at bedside.

## 2022-09-01 PROBLEM — D64.9 ANEMIA: Status: ACTIVE | Noted: 2022-09-01

## 2022-09-01 PROBLEM — G89.18 POST-OP PAIN: Status: ACTIVE | Noted: 2022-09-01

## 2022-09-01 LAB
ABSOLUTE EOS #: 0 K/UL (ref 0–0.44)
ABSOLUTE IMMATURE GRANULOCYTE: 0.22 K/UL (ref 0–0.3)
ABSOLUTE LYMPH #: 2.89 K/UL (ref 1.1–3.7)
ABSOLUTE MONO #: 1.55 K/UL (ref 0.1–1.2)
ALBUMIN SERPL-MCNC: 2.3 G/DL (ref 3.5–5.2)
ALBUMIN/GLOBULIN RATIO: 1 (ref 1–2.5)
ALP BLD-CCNC: 101 U/L (ref 35–104)
ALT SERPL-CCNC: 8 U/L (ref 5–33)
ANION GAP SERPL CALCULATED.3IONS-SCNC: 6 MMOL/L (ref 9–17)
AST SERPL-CCNC: 21 U/L
BASOPHILS # BLD: 0 % (ref 0–2)
BASOPHILS ABSOLUTE: 0 K/UL (ref 0–0.2)
BILIRUB SERPL-MCNC: 0.24 MG/DL (ref 0.3–1.2)
BUN BLDV-MCNC: 8 MG/DL (ref 6–20)
CALCIUM SERPL-MCNC: 8.2 MG/DL (ref 8.6–10.4)
CHLORIDE BLD-SCNC: 107 MMOL/L (ref 98–107)
CO2: 22 MMOL/L (ref 20–31)
CREAT SERPL-MCNC: 0.49 MG/DL (ref 0.5–0.9)
EOSINOPHILS RELATIVE PERCENT: 0 % (ref 1–4)
GFR AFRICAN AMERICAN: >60 ML/MIN
GFR NON-AFRICAN AMERICAN: >60 ML/MIN
GFR SERPL CREATININE-BSD FRML MDRD: ABNORMAL ML/MIN/{1.73_M2}
GLUCOSE BLD-MCNC: 89 MG/DL (ref 70–99)
HCT VFR BLD CALC: 22.4 % (ref 36.3–47.1)
HEMOGLOBIN: 7.2 G/DL (ref 11.9–15.1)
IMMATURE GRANULOCYTES: 1 %
LYMPHOCYTES # BLD: 13 % (ref 24–43)
MCH RBC QN AUTO: 23.6 PG (ref 25.2–33.5)
MCHC RBC AUTO-ENTMCNC: 32.1 G/DL (ref 28.4–34.8)
MCV RBC AUTO: 73.4 FL (ref 82.6–102.9)
MONOCYTES # BLD: 7 % (ref 3–12)
MORPHOLOGY: ABNORMAL
MORPHOLOGY: ABNORMAL
NRBC AUTOMATED: 0 PER 100 WBC
PDW BLD-RTO: 16.6 % (ref 11.8–14.4)
PLATELET # BLD: 260 K/UL (ref 138–453)
PMV BLD AUTO: 10.9 FL (ref 8.1–13.5)
POTASSIUM SERPL-SCNC: 3.7 MMOL/L (ref 3.7–5.3)
RBC # BLD: 3.05 M/UL (ref 3.95–5.11)
SEG NEUTROPHILS: 79 % (ref 36–65)
SEGMENTED NEUTROPHILS ABSOLUTE COUNT: 17.54 K/UL (ref 1.5–8.1)
SODIUM BLD-SCNC: 135 MMOL/L (ref 135–144)
TOTAL PROTEIN: 4.5 G/DL (ref 6.4–8.3)
WBC # BLD: 22.2 K/UL (ref 3.5–11.3)

## 2022-09-01 PROCEDURE — 85025 COMPLETE CBC W/AUTO DIFF WBC: CPT

## 2022-09-01 PROCEDURE — 1220000000 HC SEMI PRIVATE OB R&B

## 2022-09-01 PROCEDURE — 36415 COLL VENOUS BLD VENIPUNCTURE: CPT

## 2022-09-01 PROCEDURE — 6360000002 HC RX W HCPCS: Performed by: OBSTETRICS & GYNECOLOGY

## 2022-09-01 PROCEDURE — 99024 POSTOP FOLLOW-UP VISIT: CPT | Performed by: OBSTETRICS & GYNECOLOGY

## 2022-09-01 PROCEDURE — 2580000003 HC RX 258: Performed by: OBSTETRICS & GYNECOLOGY

## 2022-09-01 PROCEDURE — 6370000000 HC RX 637 (ALT 250 FOR IP)

## 2022-09-01 PROCEDURE — 80053 COMPREHEN METABOLIC PANEL: CPT

## 2022-09-01 PROCEDURE — 2580000003 HC RX 258

## 2022-09-01 RX ORDER — FERROUS SULFATE 325(65) MG
325 TABLET ORAL EVERY OTHER DAY
Qty: 180 TABLET | Refills: 1 | Status: SHIPPED | OUTPATIENT
Start: 2022-09-01

## 2022-09-01 RX ADMIN — IRON SUCROSE 200 MG: 20 INJECTION, SOLUTION INTRAVENOUS at 10:08

## 2022-09-01 RX ADMIN — ACETAMINOPHEN 1000 MG: 500 TABLET ORAL at 21:03

## 2022-09-01 RX ADMIN — DOCUSATE SODIUM 100 MG: 100 CAPSULE ORAL at 13:10

## 2022-09-01 RX ADMIN — IBUPROFEN 600 MG: 600 TABLET, FILM COATED ORAL at 21:03

## 2022-09-01 RX ADMIN — ACETAMINOPHEN 1000 MG: 500 TABLET ORAL at 13:11

## 2022-09-01 RX ADMIN — Medication 1 TABLET: at 13:10

## 2022-09-01 RX ADMIN — SODIUM CHLORIDE, PRESERVATIVE FREE 10 ML: 5 INJECTION INTRAVENOUS at 21:03

## 2022-09-01 RX ADMIN — DOCUSATE SODIUM 100 MG: 100 CAPSULE ORAL at 21:03

## 2022-09-01 ASSESSMENT — PAIN SCALES - GENERAL
PAINLEVEL_OUTOF10: 2
PAINLEVEL_OUTOF10: 2

## 2022-09-01 NOTE — CARE COORDINATION
Social Work     Sw reviewed medical record (current active problem list) and tox screens and found no concerns. Sw spoke with mom briefly to explain Sw role, inquire if any needs or concerns, and provide safe sleep education and discuss. Mom denied any needs or questions and informs baby has a safe sleep environment (Pnp and crib). Mom denied any current s/s of anxiety or depression and is aware to reach out to OB if any s/s occur after dc. Mom reports a good support system with fob (present and holding baby) and both sets of their parents, and denied any current questions or needs. Mom reports this is her 1st child. Mom states ped will be [place]. Sw encouraged mom to reach out if any issues or concerns arise.

## 2022-09-01 NOTE — PROGRESS NOTES
POST OPERATIVE DAY # 1    Bekah Wynne is a 25 y.o. female   This patient was seen and examined today. PLTCS on 8/31/22    Her pregnancy was complicated by:   Patient Active Problem List   Diagnosis    Transverse fetal lie (RSLVD)    Echogenic focus of heart of fetus affecting antepartum care of mother    Supervision of normal pregnancy    PUPP (pruritic urticarial papules and plaques of pregnancy)    Rh+/RI/GBSneg    Asthma    Family history of diabetes during pregnancy    PLTCS 8/31/22 F Apg 8/9 Wt 7#13       Today she is doing well without any chief complaint. Her lochia is light. She denies chest pain, shortness of breath, headache, lightheadedness, blurred vision and peripheral edema. She is breast feeding and she denies any signs or symptoms of mastitis. She is ambulating well. She is voiding without difficulty. She currently denies S/S of postpartum depression. Flatus present. Bowel movement absent. She is tolerating solids.     Vital Signs:  Vitals:    08/31/22 1300 08/31/22 1715 08/31/22 2045 09/01/22 0352   BP: 120/64 (!) 90/50 (!) 95/47 103/61   Pulse: 80 74 83 79   Resp: 16 16 16 16   Temp: 97.9 °F (36.6 °C) 97.9 °F (36.6 °C) 98.3 °F (36.8 °C) 98.1 °F (36.7 °C)   TempSrc:   Oral Oral   SpO2: 98% 97% 98% 100%   Weight:       Height:             Urine Input & Output last 24hrs:     Intake/Output Summary (Last 24 hours) at 9/1/2022 0414  Last data filed at 9/1/2022 0354  Gross per 24 hour   Intake 5260.94 ml   Output 5325 ml   Net -64.06 ml       Physical Exam:  General:  no apparent distress, alert and cooperative  Neurologic:  alert, oriented, normal speech, no focal findings or movement disorder noted  Lungs:  No increased work of breathing, good air exchange, clear to auscultation bilaterally, no crackles or wheezing  Heart:  Regular rate and rhythm, normal S1 and S2, no S3 or S4, and no murmur noted    Abdomen: abdomen soft, non-distended, non-tender, bowel sounds present  Fundus: non-tender, firm,

## 2022-09-01 NOTE — ANESTHESIA POSTPROCEDURE EVALUATION
Department of Anesthesiology  Postprocedure Note    Patient: Maren Oneil  MRN: [id number]  YOB: 2000  Date of evaluation: 2022      Procedure Summary     Date: 22 Room / Location: [facility] OR [address] [place] [address]    Anesthesia Start: [address] [place] [address],Third Floor Anesthesia Stop: 22 0557    Procedures:        SECTION (Abdomen)      Labor Analgesia Diagnosis:       Arrested labor      (Arrested labor [O62.1])    Surgeons: Dayton Vasquez DO Responsible Provider: Elvie Hill MD    Anesthesia Type: epidural ASA Status: 2          Anesthesia Type: No value filed.     Chloé Phase I: Chloé Score: 10    Chloé Phase II:        Anesthesia Post Evaluation    Patient location during evaluation: floor  Patient participation: complete - patient participated  Level of consciousness: awake  Pain score: 0  Airway patency: patent  Nausea & Vomiting: no vomiting and no nausea  Complications: no  Cardiovascular status: blood pressure returned to baseline  Respiratory status: acceptable  Hydration status: stable

## 2022-09-01 NOTE — LACTATION NOTE
Mom reports having difficulty getting baby latched. Refined positioning a little. Baby would do a few latches and sucks. Baby continues to tongue thrust, causing the nipple to be pushed out of her mouth. Using a 20 mm nipple shield and a few drops of formula, baby latched and began feeding well. Mom noted it felt comfortable. Reviewed feeding patterns. Encouraged her to call out for assistance as needed.

## 2022-09-01 NOTE — PROGRESS NOTES
Baby brought into room to breastfeed, baby frantic. RN offered lactation assistance, patient declined. Patient called out shortly after to have RN take baby to nursery to be bottle fed. Patient tired and needs a break.  Rn provides reassurance and encourages patient to relax and rest.

## 2022-09-01 NOTE — LACTATION NOTE
This note was copied from a baby's chart. RN in room to assist with breastfeeding. MOB has baby placed in proper cross cradle hold. MOB laying back, RN suggested that MOB try to sit up more to help baby get on a little deeper. Baby has frequent pop offs, but once is on does suck well and swallows are audible. RN suggested that MOB try to lift her breast up a little more so baby can sustain a deeper latch. MOB states that baby may have a tongue tie. Rn reviewed how to hand express colostrum and MOB has great amount of colostrum. RN suggested that if baby is too sleepy, to hand express colostrum into baby's mouth to try to entice a feed.

## 2022-09-01 NOTE — LACTATION NOTE
Assisted with position and latch on the right breast using a syringe with formula. Baby latched and began feeding well.

## 2022-09-02 VITALS
RESPIRATION RATE: 16 BRPM | DIASTOLIC BLOOD PRESSURE: 70 MMHG | WEIGHT: 157 LBS | OXYGEN SATURATION: 98 % | HEIGHT: 63 IN | SYSTOLIC BLOOD PRESSURE: 110 MMHG | TEMPERATURE: 98.2 F | BODY MASS INDEX: 27.82 KG/M2 | HEART RATE: 87 BPM

## 2022-09-02 PROBLEM — Z98.890 POSTOPERATIVE STATE: Status: ACTIVE | Noted: 2022-09-02

## 2022-09-02 PROCEDURE — 6370000000 HC RX 637 (ALT 250 FOR IP)

## 2022-09-02 PROCEDURE — 99238 HOSP IP/OBS DSCHRG MGMT 30/<: CPT | Performed by: OBSTETRICS & GYNECOLOGY

## 2022-09-02 RX ADMIN — IBUPROFEN 600 MG: 600 TABLET, FILM COATED ORAL at 07:27

## 2022-09-02 RX ADMIN — IBUPROFEN 600 MG: 600 TABLET, FILM COATED ORAL at 13:34

## 2022-09-02 RX ADMIN — ACETAMINOPHEN 1000 MG: 500 TABLET ORAL at 07:27

## 2022-09-02 RX ADMIN — ACETAMINOPHEN 1000 MG: 500 TABLET ORAL at 13:34

## 2022-09-02 ASSESSMENT — PAIN SCALES - GENERAL
PAINLEVEL_OUTOF10: 3
PAINLEVEL_OUTOF10: 2

## 2022-09-02 NOTE — FLOWSHEET NOTE
[place]  Depression Scale completed and documented. Paper copy placed in chart. Pt has no questions or concerns at this time. Pt score 1.

## 2022-09-02 NOTE — PROGRESS NOTES
CLINICAL PHARMACY NOTE: MEDS TO BEDS    Total # of Prescriptions Filled: 4   The following medications were delivered to the patient:       OXYCODONE 5   IRON 325     Additional Documentation:

## 2022-09-02 NOTE — PROGRESS NOTES
saturation  Extremities:  no calf tenderness, non edematous    Labs:  Lab Results   Component Value Date    WBC 22.2 (H) 2022    HGB 7.2 (L) 2022    HCT 22.4 (L) 2022    MCV 73.4 (L) 2022     2022       Assessment/Plan:  Dax Lozano is a  POD # 1 s/p PLTCS   - Doing well, VSS    - female infant in general care nursery   - Encourage ambulation and use of incentive spirometer   - S/p pelaez catheter and saline lock IV on POD #1   Rh positive/Rubella immune  Breast feeding  gHTN (new dx)   - PreE labs wnl x1, P/C 0.25  - BP normotensive   - Denies s/s PreE   - Continue to monitor closely  Anemia/Intra-op Hemorrhage   - QBL 1025ml   - Bleeding stable   - Asymptomatic currently  - Post-op Hgb 7.2   - Patient cannot tolerate PO iron   - Venofer x1 given in patient  Asthma   - Exercised induced  - No on medication  Continue post-op care. Counseling Completed:  Secondary Smoke risks and Sudden Infant Death Syndrome were reviewed with recommendations. Infant sleeping, \"back to sleep\" and avoidance of co-sleeping recommendations were reviewed. Signs and Symptoms of Post Partum Depression were reviewed. The patient is to call if any occur. Signs and symptoms of Mastitis were reviewed. The patient is to call if any occur for follow up. Discharge instructions including pelvic rest, incision care, 15 lb weight restriction, no driving with pain medicine and office follow-up were reviewed with patient     Attending Physician: Dr. Pee Moore, [place]  Ob/Gyn Resident  2022, 6:36 AM    Date: 2022  Time: 10:30 AM      Patient Name: Dax Lozano  Patient : 2000  Room/Bed: [facility]  Admission Date/Time: 2022 12:49 AM        Attending Physician Statement  I have personally seen, evaluated and discussed the care of Dax Lozano, including pertinent history and exam findings with the resident.  I have reviewed and edited their note in the electronic medical record. The key elements of all parts of the encounter have been performed/reviewed by me. I agree with the assessment, plan and orders as documented by the resident. The level of care submitted represents to the best of my ability the care documented in the medical record today. GC Modifier. This service has been performed in part by a resident under the direction of a teaching physician. Attending's Name:  Kori Thompson MD      Patient doing well. She has no concerns or complaints. Continue routine post-partum care. Stable for discharge.

## 2022-09-02 NOTE — LACTATION NOTE
Baby cueing and unable to maintain latch, keeps pulling off. 20mm nipple shield applied, baby maintained latched with vigorous suck.

## 2022-09-02 NOTE — LACTATION NOTE
Mom attempting to feed, baby latches well but keeps falling asleep at breast.  Baby was bottle fed formula during the night with last bottle being at 0330. Discussed benefit of keeping baby in STS and frequently offering the breast when baby quietly alert. Reviewed early feeding cues, voiding/stooling patterns, and LC follow up. Has a breast pump at home, discussed when to introduce pump.

## 2022-09-06 PROBLEM — G89.18 POST-OP PAIN: Status: RESOLVED | Noted: 2022-09-01 | Resolved: 2022-09-06

## 2022-09-06 PROBLEM — Z98.890 POSTOPERATIVE STATE: Status: RESOLVED | Noted: 2022-09-02 | Resolved: 2022-09-06

## 2022-09-06 PROBLEM — Z3A.39 39 WEEKS GESTATION OF PREGNANCY: Status: RESOLVED | Noted: 2022-08-30 | Resolved: 2022-09-06

## 2022-09-06 PROBLEM — Z34.90 SUPERVISION OF NORMAL PREGNANCY: Status: RESOLVED | Noted: 2022-08-02 | Resolved: 2022-09-06

## 2022-09-06 PROBLEM — D64.9 ANEMIA: Status: RESOLVED | Noted: 2022-09-01 | Resolved: 2022-09-06

## 2022-09-06 PROBLEM — O32.2XX0 TRANSVERSE FETAL LIE: Status: RESOLVED | Noted: 2022-05-04 | Resolved: 2022-09-06

## 2022-09-07 ENCOUNTER — POSTPARTUM VISIT (OUTPATIENT)
Dept: OBGYN CLINIC | Age: 22
End: 2022-09-07

## 2022-09-07 VITALS
HEART RATE: 86 BPM | SYSTOLIC BLOOD PRESSURE: 130 MMHG | DIASTOLIC BLOOD PRESSURE: 76 MMHG | BODY MASS INDEX: 25.37 KG/M2 | WEIGHT: 143.2 LBS

## 2022-09-07 PROCEDURE — 0503F POSTPARTUM CARE VISIT: CPT | Performed by: STUDENT IN AN ORGANIZED HEALTH CARE EDUCATION/TRAINING PROGRAM

## 2022-09-07 NOTE — PROGRESS NOTES
partum care    Patient Active Problem List    Diagnosis Date Noted    PLTCS 8/31/22 F Apg 8/9 Wt 7#13 08/31/2022     Priority: Medium    Asthma 08/30/2022     Priority: Medium    Family history of diabetes during pregnancy 08/30/2022     Priority: Medium    PUPP (pruritic urticarial papules and plaques of pregnancy) 08/03/2022     Priority: Medium    Echogenic focus of heart of fetus affecting antepartum care of mother 05/06/2022     Priority: Medium     Declining NIPT       Return in about 5 weeks (around 10/12/2022) for PP Visit. Counseling Completed:  Signs & Symptoms of mastitis and when to notify office discussed.   Secondary smoke risks including increased risks of respiratory problems, Sudden infant death syndrome, and potential malignancies discussed  Abstinence, family planning counseling and STD counseling discussed  Continue with post operative restrictions until 6 weeks post partum  No heavy lifting or [place] until 6 weeks post partum    DO [facility]   9/7/2022, 2:03 PM

## 2022-10-12 ENCOUNTER — POSTPARTUM VISIT (OUTPATIENT)
Dept: OBGYN CLINIC | Age: 22
End: 2022-10-12

## 2022-10-12 VITALS — DIASTOLIC BLOOD PRESSURE: 70 MMHG | SYSTOLIC BLOOD PRESSURE: 121 MMHG | BODY MASS INDEX: 25.69 KG/M2 | WEIGHT: 145 LBS

## 2022-10-12 PROCEDURE — 0503F POSTPARTUM CARE VISIT: CPT | Performed by: STUDENT IN AN ORGANIZED HEALTH CARE EDUCATION/TRAINING PROGRAM

## 2022-10-12 NOTE — PROGRESS NOTES
Postpartum Visit    Twila Parker is a 25 y.o. female , 6 weeks Post Partum s/p PLTCS on 22    The patient was seen. Her pregnancy was complicated by Echogenic cardiac focus. She is  breast feeding and denies signs or symptoms of mastitis. The patient completed the E.P.D.S. Post Partum Depression Evaluation form and scored 1. She does not have signs or symptoms of post partum depression. She denies any suicidal thoughts with a plan, intent to harm others, and delusional ideas. She does admit to having good home support. Today her lochia is light she denies any dizziness or shortness of breath. Her bowels are regular and she denies any urinary tract symptomology. She is using no method for family planning and for STD prevention. OB History    Para Term  AB Living   1 1 1 0 0 1   SAB IAB Ectopic Molar Multiple Live Births   0 0 0 0 0 1     Patient Active Problem List   Diagnosis    Echogenic focus of heart of fetus affecting antepartum care of mother    PUPP (pruritic urticarial papules and plaques of pregnancy)    Asthma    Family history of diabetes during pregnancy    PLTCS 22 F Apg 8/9 Wt 7#13       Vitals:   Blood pressure 121/70, weight 145 lb (65.8 kg), last menstrual period 2021, currently breastfeeding. Physical Exam:  General:  no apparent distress, alert and cooperative  Lungs:  No increased work of breathing, good air exchange, clear to auscultation bilaterally, no crackles or wheezing  Heart:  regular rate and rhythm and no murmur    Abdomen: Abdomen soft, non-tender.  BS normal. No masses,  No organomegaly  Fundus: non-tender, normal size, firm, below umbilicus  Incision: clean, dry, and intact  Extremities:  no calf tenderness, non edematous    Assessment:  Twila Parker is a 25 y.o. female , 6 weeks Post Partum s/p PLTCS on 22   - Stable, discontinue routine post partum care    Patient Active Problem List    Diagnosis Date Noted    PLTCS 8/31/22 F Apg 8/9 Wt 7#13 08/31/2022     Priority: Medium    Asthma 08/30/2022     Priority: Medium    Family history of diabetes during pregnancy 08/30/2022     Priority: Medium    PUPP (pruritic urticarial papules and plaques of pregnancy) 08/03/2022     Priority: Medium    Echogenic focus of heart of fetus affecting antepartum care of mother 05/06/2022     Priority: Medium     Declining NIPT       Return in about 6 months (around 4/12/2023) for Annual.    Counseling Completed:  Family planning counseling and STD counseling discussed  Discontinue with postpartum restrictions  Can resume heavy lifting and Radha Lucero 364, DO  [facility]   10/12/2022, 11:00 AM

## 2023-01-16 ENCOUNTER — OFFICE VISIT (OUTPATIENT)
Dept: OBGYN CLINIC | Age: 23
End: 2023-01-16
Payer: COMMERCIAL

## 2023-01-16 VITALS
SYSTOLIC BLOOD PRESSURE: 134 MMHG | DIASTOLIC BLOOD PRESSURE: 87 MMHG | BODY MASS INDEX: 24.87 KG/M2 | WEIGHT: 140.4 LBS | HEART RATE: 113 BPM

## 2023-01-16 DIAGNOSIS — Z30.09 GENERAL COUNSELLING AND ADVICE ON CONTRACEPTION: Primary | ICD-10-CM

## 2023-01-16 PROCEDURE — 99213 OFFICE O/P EST LOW 20 MIN: CPT | Performed by: STUDENT IN AN ORGANIZED HEALTH CARE EDUCATION/TRAINING PROGRAM

## 2023-01-16 RX ORDER — NORGESTIMATE AND ETHINYL ESTRADIOL 0.25-0.035
1 KIT ORAL DAILY
Qty: 3 PACKET | Refills: 3 | Status: SHIPPED | OUTPATIENT
Start: 2023-01-16

## 2023-01-16 NOTE — PROGRESS NOTES
[address] [facility]   [address] [facility], [address] [place] [address]      Problem Visit      aMico Patel  2023                       Primary Care Physician: No primary care provider on file. CC:   Chief Complaint   Patient presents with    Other     Wants to go back on birth control pills          HPI: Maico Patel is a 25 y.o. female  Patient's last menstrual period was 2023. The patient was seen and examined. She is here for contraception counseling and is complaining of nothing. Patient had previously been on birth control pills and would like to restart at this time. Patient has no questions or concerns at this time. Patient denies a history of blood clots, migraines or personal history of high blood pressure.     REVIEW OF SYSTEMS:  Constitutional: negative fever, negative chills  HEENT: negative visual disturbances, negative headaches  Respiratory: negative dyspnea, negative cough  Cardiovascular: negative chest pain,  negative palpitations  Gastrointestinal: negative abdominal pain, negative RUQ pain, negative N/V, negative diarrhea, negative constipation  Genitourinary: negative dysuria, negative vaginal discharge  Dermatological: negative rash  Hematologic: negative bruising  Immunologic/Lymphatic: negative recent illness, negative recent sick contact  Musculoskeletal: negative back pain, negative myalgias, negative arthralgias  Neurological:  negative dizziness, negative weakness  Behavior/Psych: negative depression, negative anxiety    OBSTETRICAL HISTORY:  OB History    Para Term  AB Living   1 1 1 0 0 1   SAB IAB Ectopic Molar Multiple Live Births   0 0 0 0 0 1      # Outcome Date GA Lbr Alfonso/2nd Weight Sex Delivery Anes PTL Lv   1 Term 22 39w1d  7 lb 13.4 oz (3.555 kg) F CS-LTranv EPI, Spinal N DEJA       PAST MEDICAL HISTORY:      Diagnosis Date    Asthma     activity induced    Neurologic disorder 10/2021    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)    Post-op Anemia  2022    Stress     gets hives       PAST SURGICAL HISTORY:                                                                    Procedure Laterality Date    ADENOIDECTOMY       SECTION N/A 2022     SECTION performed by Sheryl Ingram DO at [facility] OR    TONSILLECTOMY      WISDOM TOOTH EXTRACTION  2018       MEDICATIONS:  Current Outpatient Medications   Medication Sig Dispense Refill    norgestimate-ethinyl estradiol (ORTHO-CYCLEN, 28,) 0.25-35 MG-MCG per tablet Take 1 tablet by mouth daily 3 packet 3    albuterol sulfate  (90 Base) MCG/ACT inhaler Inhale 2 puffs into the lungs every 6 hours as needed for Wheezing       No current facility-administered medications for this visit. ALLERGIES:   Allergies as of 2023    (No Known Allergies)                                   VITALS:  Vitals:    23 0955 23 1010   BP: (!) 145/79 134/87   Site: Left Upper Arm Left Upper Arm   Position: Sitting Sitting   Cuff Size: Medium Adult Medium Adult   Pulse: (!) 121 (!) 113   Weight: 140 lb 6.4 oz (63.7 kg)                                                                                                                                                                     PHYSICAL EXAM:   General Appearance: Appears healthy. Alert; in no acute distress. Pleasant. Skin: Skin color, texture, turgor normal. No rashes or lesions. HEENT: normocephalic and atraumatic, Thyroid normal to inspection and palpation  Respiratory: Normal expansion. Clear to auscultation. No rales, rhonchi, or wheezing.   Cardiovascular: normal rate, normal S1 and S2, no gallops, intact distal pulses and no carotid bruits  Breast:  (Chest): N/A  Abdomen: soft, non-tender, non-distended, no right upper quadrant tenderness and no CVA tenderness  Pelvic Exam: N/A  Rectal Exam: exam declined by patient  Musculoskeletal: no gross abnormalities  Extremities: non-tender BLE and non-edematous  Psych:  oriented to time, place and person       DATA:  No results found for this visit on 01/16/23. ASSESSMENT & PLAN:    Ricardo Crowder is a 25 y.o. female [id number] Patient's last menstrual period was 01/12/2023. Restart OCPs   - Rx sent for Ortho Cyclen to pharmacy    Patient Active Problem List    Diagnosis Date Noted    PLTCS 8/31/22 F Apg 8/9 Wt 7#13 08/31/2022     Priority: Medium    Asthma 08/30/2022     Priority: Medium    Family history of diabetes during pregnancy 08/30/2022     Priority: Medium    PUPP (pruritic urticarial papules and plaques of pregnancy) 08/03/2022     Priority: Medium    Echogenic focus of heart of fetus affecting antepartum care of mother 05/06/2022     Priority: Medium     Declining NIPT         Return in about 3 months (around 4/16/2023) for Annual.    Counseling Completed:    Discussed need for repeat pap as per American Society for Colposcopy and Cervical Pathology guidelines. Discussed need for mammograms every 1 year, If >44 yo and last mammogram was negative. Discussed Calcium and Vitamin D dosing. Discussed need for colonoscopy screening as well as onset for bone density testing. Discussed birth control and barrier recommendations. Discussed STD counseling and prevention. Discussed Gardisil counseling for all patients 10-35 yo. Hereditary Breast, Ovarian, Colon and Uterine Cancer screening discussed. Tobacco & Secondary smoke risks discussed; with recommendation for cessation and avoidance. Routine health maintenance per patients PCP discussed. Patient was seen with total face to face time of 15 minutes. More than 50% of this visit was on counseling and education regarding her diagnose(s) as listed below and options. She was also counseled on her preventative health maintenance recommendations and follow-up. Diagnosis Orders   1.  General counselling and advice on contraception norgestimate-ethinyl estradiol (ORTHO-CYCLEN, 28,) 0.25-35 MG-MCG per tablet            DO [facility]   1/16/2023, 11:21 AM wheezing & SOB.  No productive cough.  No pleuritic chest pain.

## 2023-04-13 PROBLEM — O35.BXX0 ECHOGENIC FOCUS OF HEART OF FETUS AFFECTING ANTEPARTUM CARE OF MOTHER: Status: RESOLVED | Noted: 2022-05-06 | Resolved: 2023-04-13

## 2023-04-13 PROBLEM — O26.86 PUPP (PRURITIC URTICARIAL PAPULES AND PLAQUES OF PREGNANCY): Status: RESOLVED | Noted: 2022-08-03 | Resolved: 2023-04-13

## 2023-12-14 DIAGNOSIS — Z30.09 GENERAL COUNSELLING AND ADVICE ON CONTRACEPTION: ICD-10-CM

## 2024-05-14 NOTE — PROGRESS NOTES
[facility]  [address] [facility] [address]     Elana Farias  2024                       Primary Care Physician: No primary care provider on file.    CC:   Chief Complaint   Patient presents with    Annual Exam         HPI: Elana Farias is a 23 y.o. female  Patient's last menstrual period was 2024.    The patient was seen and examined. She is here for an annual visit. She is complaining of stress urinary incontinence, increasing in severity over the past 2-3 months. She reports getting the urge to void, then often not making it to the restroom and voiding large amounts.  No recent injuries or signs/symptoms of UTI. No stress incontinence. She does drink caffeine but the amount has not changed recently.     She denies irregular/heavy bleeding and dysmenorrhea. Her periods are regular and last just a few days days. She describes them as light.     Her bowel habits are regular. She denies any bloating.  She denies dysuria. She complains of urinary leaking.  She denies vaginal discharge.  She is sexually active with single partner, contraception - OCP (estrogen/progesterone).     Depression Screen: Symptoms of decreased mood absent  Symptoms of anhedonia absent  **If either question is answered in a  positive fashion then complete the PHQ9 Scoring Evaluation and make the appropriate referral**    REVIEW OF SYSTEMS:   Constitutional: negative fever, negative chills  HEENT: negative visual disturbances, negative headaches  Respiratory: negative dyspnea, negative cough  Cardiovascular: negative chest pain,  negative palpitations  Gastrointestinal: negative abdominal pain, negative RUQ pain, negative N/V, negative diarrhea, negative constipation  Genitourinary: negative dysuria, negative vaginal discharge  Dermatological: negative rash  Hematologic: negative bruising  Immunologic/Lymphatic: negative recent illness, negative

## 2024-05-16 ENCOUNTER — OFFICE VISIT (OUTPATIENT)
Dept: OBGYN CLINIC | Age: 24
End: 2024-05-16
Payer: COMMERCIAL

## 2024-05-16 VITALS
WEIGHT: 129.6 LBS | HEART RATE: 90 BPM | DIASTOLIC BLOOD PRESSURE: 75 MMHG | SYSTOLIC BLOOD PRESSURE: 130 MMHG | BODY MASS INDEX: 22.96 KG/M2

## 2024-05-16 DIAGNOSIS — Z30.09 GENERAL COUNSELLING AND ADVICE ON CONTRACEPTION: ICD-10-CM

## 2024-05-16 DIAGNOSIS — Z01.419 WOMEN'S ANNUAL ROUTINE GYNECOLOGICAL EXAMINATION: Primary | ICD-10-CM

## 2024-05-16 DIAGNOSIS — N39.41 URGE INCONTINENCE: ICD-10-CM

## 2024-05-16 PROCEDURE — 99395 PREV VISIT EST AGE 18-39: CPT | Performed by: OBSTETRICS & GYNECOLOGY

## 2024-05-16 RX ORDER — OXYBUTYNIN CHLORIDE 5 MG/1
5 TABLET, EXTENDED RELEASE ORAL DAILY
Qty: 30 TABLET | Refills: 3 | Status: SHIPPED | OUTPATIENT
Start: 2024-05-16

## 2024-05-16 RX ORDER — NORGESTIMATE AND ETHINYL ESTRADIOL 0.25-0.035
1 KIT ORAL DAILY
Qty: 84 TABLET | Refills: 4 | Status: SHIPPED | OUTPATIENT
Start: 2024-05-16

## 2024-09-11 RX ORDER — OXYBUTYNIN CHLORIDE 5 MG/1
5 TABLET, EXTENDED RELEASE ORAL DAILY
Qty: 30 TABLET | Refills: 3 | Status: SHIPPED | OUTPATIENT
Start: 2024-09-11

## 2025-01-13 RX ORDER — OXYBUTYNIN CHLORIDE 5 MG/1
5 TABLET, EXTENDED RELEASE ORAL DAILY
Qty: 30 TABLET | Refills: 3 | Status: SHIPPED | OUTPATIENT
Start: 2025-01-13

## 2025-05-14 RX ORDER — OXYBUTYNIN CHLORIDE 5 MG/1
5 TABLET, EXTENDED RELEASE ORAL DAILY
Qty: 30 TABLET | Refills: 3 | Status: SHIPPED | OUTPATIENT
Start: 2025-05-14

## (undated) DEVICE — KENDALL SCD EXPRESS SLEEVES, KNEE LENGTH, MEDIUM: Brand: KENDALL SCD

## (undated) DEVICE — TOWEL SURG W16XL26IN WHT NONFENESTRATED ST 2 PER PK

## (undated) DEVICE — SOLUTION IV IRRIG WATER 500ML POUR BRL ST 2F7113

## (undated) DEVICE — 3M™ STERI-STRIP™ ANTIMICROBIAL SKIN CLOSURES 1 IN X 5 IN, 25/CAR, 4 CAR/CASE A1848: Brand: 3M™ STERI-STRIP™

## (undated) DEVICE — SWAB MEDICATED TINC BENZ

## (undated) DEVICE — SUTURE COAT VCRL SZ 0 L36IN ABSRB VLT CTX L48MM TAPERPOINT J370H

## (undated) DEVICE — TRAY SPNL 24GA L4IN PENCAN PNCL PNT NDL 0.75% BIPIVCAIN W/

## (undated) DEVICE — SOLUTION SOD CHL 0.9% 1000ML